# Patient Record
Sex: FEMALE | Race: WHITE | NOT HISPANIC OR LATINO | Employment: UNEMPLOYED | URBAN - NONMETROPOLITAN AREA
[De-identification: names, ages, dates, MRNs, and addresses within clinical notes are randomized per-mention and may not be internally consistent; named-entity substitution may affect disease eponyms.]

---
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
## 2018-01-05 ENCOUNTER — APPOINTMENT (OUTPATIENT)
Dept: LAB | Facility: HOSPITAL | Age: 18
End: 2018-01-05

## 2018-01-05 ENCOUNTER — OFFICE VISIT (OUTPATIENT)
Dept: ENDOCRINOLOGY | Facility: CLINIC | Age: 18
End: 2018-01-05

## 2018-01-05 ENCOUNTER — OFFICE VISIT (OUTPATIENT)
Dept: OBSTETRICS AND GYNECOLOGY | Facility: CLINIC | Age: 18
End: 2018-01-05

## 2018-01-05 VITALS
SYSTOLIC BLOOD PRESSURE: 128 MMHG | HEART RATE: 96 BPM | DIASTOLIC BLOOD PRESSURE: 80 MMHG | HEIGHT: 66 IN | WEIGHT: 169.25 LBS | BODY MASS INDEX: 27.2 KG/M2

## 2018-01-05 VITALS
WEIGHT: 168 LBS | BODY MASS INDEX: 27 KG/M2 | HEIGHT: 66 IN | SYSTOLIC BLOOD PRESSURE: 118 MMHG | DIASTOLIC BLOOD PRESSURE: 72 MMHG

## 2018-01-05 DIAGNOSIS — Z72.0 TOBACCO ABUSE: ICD-10-CM

## 2018-01-05 DIAGNOSIS — Z30.013 INITIATION OF DEPO PROVERA: ICD-10-CM

## 2018-01-05 DIAGNOSIS — R94.6 ABNORMAL THYROID FUNCTION TEST: Primary | ICD-10-CM

## 2018-01-05 DIAGNOSIS — R94.6 ABNORMAL THYROID FUNCTION TEST: ICD-10-CM

## 2018-01-05 DIAGNOSIS — N92.1 MENOMETRORRHAGIA: Primary | ICD-10-CM

## 2018-01-05 DIAGNOSIS — N94.6 DYSMENORRHEA: ICD-10-CM

## 2018-01-05 DIAGNOSIS — R53.83 FATIGUE, UNSPECIFIED TYPE: ICD-10-CM

## 2018-01-05 LAB
T4 FREE SERPL-MCNC: 1.09 NG/DL (ref 0.78–2.19)
TSH SERPL DL<=0.05 MIU/L-ACNC: 4.38 MIU/ML (ref 0.46–4.68)

## 2018-01-05 PROCEDURE — 99214 OFFICE O/P EST MOD 30 MIN: CPT | Performed by: NURSE PRACTITIONER

## 2018-01-05 PROCEDURE — 36415 COLL VENOUS BLD VENIPUNCTURE: CPT | Performed by: NURSE PRACTITIONER

## 2018-01-05 PROCEDURE — 84439 ASSAY OF FREE THYROXINE: CPT | Performed by: NURSE PRACTITIONER

## 2018-01-05 PROCEDURE — 84443 ASSAY THYROID STIM HORMONE: CPT | Performed by: NURSE PRACTITIONER

## 2018-01-05 PROCEDURE — 96372 THER/PROPH/DIAG INJ SC/IM: CPT | Performed by: OBSTETRICS & GYNECOLOGY

## 2018-01-05 PROCEDURE — 99212 OFFICE O/P EST SF 10 MIN: CPT | Performed by: OBSTETRICS & GYNECOLOGY

## 2018-01-05 PROCEDURE — 99406 BEHAV CHNG SMOKING 3-10 MIN: CPT | Performed by: NURSE PRACTITIONER

## 2018-01-05 RX ORDER — MEDROXYPROGESTERONE ACETATE 150 MG/ML
150 INJECTION, SUSPENSION INTRAMUSCULAR ONCE
Status: COMPLETED | OUTPATIENT
Start: 2018-01-05 | End: 2018-01-05

## 2018-01-05 RX ORDER — MEDROXYPROGESTERONE ACETATE 150 MG/ML
150 INJECTION, SUSPENSION INTRAMUSCULAR
Qty: 1 ML | Refills: 3 | Status: SHIPPED | OUTPATIENT
Start: 2018-01-05 | End: 2018-06-15 | Stop reason: DRUGHIGH

## 2018-01-05 RX ADMIN — MEDROXYPROGESTERONE ACETATE 150 MG: 150 INJECTION, SUSPENSION INTRAMUSCULAR at 10:18

## 2018-01-05 NOTE — PROGRESS NOTES
Subjective   Vianca Dunn is a 17 y.o. female.     HPI Comments: Patient presents today for f/u regarding menometrorrhagia and dysmenorrhea.   2 mo ago we had switched OCPs and patient reports further partial improvement in her symptoms  She requests the Depo Shot. We reviewed R/B/A. Risks discussed included irregular bleeding pattern, weight gain, slow return to fertility and loss of bone mineral density  Patient had an endocrine appointment earlier this morning      The following portions of the patient's history were reviewed and updated as appropriate: allergies, current medications, past family history, past medical history, past social history, past surgical history and problem list.      Review of Systems   Genitourinary: Positive for menstrual problem.       Objective   Physical Exam   Constitutional: She is oriented to person, place, and time. She appears well-developed and well-nourished. No distress.   HENT:   Head: Normocephalic and atraumatic.   Right Ear: External ear normal.   Left Ear: External ear normal.   Nose: Nose normal.   Mouth/Throat: Oropharynx is clear and moist.   Neurological: She is alert and oriented to person, place, and time.   Skin: She is not diaphoretic.   Psychiatric: She has a normal mood and affect. Her behavior is normal. Judgment and thought content normal.   Vitals reviewed.      Assessment/Plan   Vianca was seen today for follow-up.    Diagnoses and all orders for this visit:    Menometrorrhagia    Dysmenorrhea    Abnormal thyroid function test    Other orders  -     medroxyPROGESTERone (DEPO-PROVERA) 150 MG/ML injection; Inject 1 mL into the shoulder, thigh, or buttocks Every 3 (Three) Months.       Switch to Depo  F/u 3 mo when next shot is due  Continue f/u with endocrine

## 2018-01-05 NOTE — PROGRESS NOTES
Subjective    Vianca Dunn is a 17 y.o. female. she is here today for follow-up.    History of Present Illness           History      17 year old female presents for follow up      REASON - abnormal thyroid function studies         Duration  - August 2017     Timing - intermittent -- one abnormal in August repeat normal       Quality -  controlled     Severity - mild     Context - presented to GYN for heavy periods, thyroid function study was performed and found to be abnormal      Location/size - no ultrasound      Quantity -            Lab Results   Component Value Date     TSH 5.960 (H) 08/15/2017         Lab Results   Component Value Date    TSH 4.080 09/29/2017          Symptoms - heavy periods, fatigue, hair loss     Alleviating Factors - none     Aggravating Factors - none             Evaluation history:  TSH   Date Value Ref Range Status   09/29/2017 4.080 0.460 - 4.680 mIU/mL Final     Free T4   Date Value Ref Range Status   09/29/2017 1.29 0.78 - 2.19 ng/dL Final       Current medications:  Current Outpatient Prescriptions   Medication Sig Dispense Refill   • norgestrel-ethinyl estradiol (LOW-OGESTREL,CRYSELLE) 0.3-30 MG-MCG per tablet Take 1 tablet by mouth Daily. 28 tablet 12     No current facility-administered medications for this visit.        The following portions of the patient's history were reviewed and updated as appropriate:   Past Medical History:   Diagnosis Date   • Acute dermatitis      buttocks      • Acute gastroenteritis    • Acute otitis media, right    • Allergic rhinitis    • Candidiasis of skin      caitlin-anal area      • Lymphadenopathy    • Otitis media, chronic, bilateral    • Upper respiratory infection      History reviewed. No pertinent surgical history.  Family History   Problem Relation Age of Onset   • Thyroid disease Maternal Grandmother      OB History     No data available        No Known Allergies  Social History     Social History   • Marital status: Single     Spouse  "name: N/A   • Number of children: N/A   • Years of education: N/A     Social History Main Topics   • Smoking status: Current Every Day Smoker     Packs/day: 1.00     Types: Cigarettes   • Smokeless tobacco: Never Used   • Alcohol use No   • Drug use: No   • Sexual activity: Yes     Partners: Male     Birth control/ protection: OCP     Other Topics Concern   • None     Social History Narrative       Review of Systems  Review of Systems   Constitutional: Negative for activity change, appetite change, diaphoresis and fatigue.   HENT: Negative for congestion, dental problem, facial swelling, sneezing, sore throat, tinnitus, trouble swallowing and voice change.    Eyes: Negative for photophobia, pain, discharge, redness, itching and visual disturbance.   Respiratory: Negative for apnea, cough, choking, chest tightness and shortness of breath.    Cardiovascular: Negative for chest pain, palpitations and leg swelling.   Gastrointestinal: Negative for abdominal distention, abdominal pain, constipation, diarrhea, nausea and vomiting.   Endocrine: Negative for cold intolerance, heat intolerance, polydipsia, polyphagia and polyuria.   Genitourinary: Negative for difficulty urinating, dysuria, frequency, hematuria and urgency.   Musculoskeletal: Negative for arthralgias, back pain, gait problem, joint swelling, myalgias, neck pain and neck stiffness.   Skin: Negative for color change, pallor, rash and wound.   Allergic/Immunologic: Negative for environmental allergies and food allergies.   Neurological: Negative for dizziness, tremors, facial asymmetry, weakness, light-headedness, numbness and headaches.   Hematological: Negative for adenopathy. Does not bruise/bleed easily.   Psychiatric/Behavioral: Negative for agitation, behavioral problems, confusion and sleep disturbance.        Objective    /80 (BP Location: Left arm, Patient Position: Sitting, Cuff Size: Adult)  Pulse 96  Ht 167.6 cm (66\")  Wt 76.8 kg (169 lb 4 " oz)  LMP 12/16/2017 (Exact Date)  Breastfeeding? No  BMI 27.32 kg/m2  Physical Exam   Constitutional: She is oriented to person, place, and time. She appears well-developed and well-nourished. No distress.   HENT:   Head: Normocephalic and atraumatic.   Right Ear: External ear normal.   Left Ear: External ear normal.   Nose: Nose normal.   Eyes: Conjunctivae and EOM are normal. Pupils are equal, round, and reactive to light.   Neck: Normal range of motion. Neck supple. No tracheal deviation present. No thyromegaly present.   Cardiovascular: Normal rate, regular rhythm and normal heart sounds.    No murmur heard.  Pulmonary/Chest: Effort normal and breath sounds normal. No respiratory distress. She has no wheezes.   Abdominal: Soft. Bowel sounds are normal. There is no tenderness. There is no rebound and no guarding.   Musculoskeletal: Normal range of motion. She exhibits no edema, tenderness or deformity.   Neurological: She is alert and oriented to person, place, and time. No cranial nerve deficit.   Skin: Skin is warm and dry. No rash noted.   Psychiatric: She has a normal mood and affect. Her behavior is normal. Judgment and thought content normal.       Lab Review  Lab Results   Component Value Date    TSH 4.080 09/29/2017     Lab Results   Component Value Date    FREET4 1.29 09/29/2017        Assessment/Plan      1. Abnormal thyroid function test    2. Fatigue, unspecified type    3. Tobacco abuse    . This diagnosis was discussed and reviewed with the patient including the advantages of drug therapy.     1. Orders placed during this encounter include:  Orders Placed This Encounter   Procedures   • TSH   • T4, Free       Medications prescribed:  Outpatient Encounter Prescriptions as of 1/5/2018   Medication Sig Dispense Refill   • norgestrel-ethinyl estradiol (LOW-OGESTREL,CRYSELLE) 0.3-30 MG-MCG per tablet Take 1 tablet by mouth Daily. 28 tablet 12     No facility-administered encounter medications on file  as of 1/5/2018.      Abnormal thyroid function repeat normal   Fatigue         Lab Results   Component Value Date    TSH 4.080 09/29/2017    D8GXMBE 236.0 (H) 09/29/2017    THYROIDAB 10 09/29/2017         Repeat labs again today and I will call with results    If normal she will just follow up with primary care to monitor TSH            Fatigue      Try to exercise   Discussed sleep habits     Menometrorrhagia     Continue OCPs    Preventive Care     I advised the patient of the risks in continuing to use tobacco, and I provided this patient with smoking cessation educational materials.  I also discussed how to quit smoking and the patient has expressed no willingness to quit.      During this visit, I spent 3 minutes  counseling the patient regarding smoking cessation.            4. Return for Recheck.

## 2018-03-30 ENCOUNTER — CLINICAL SUPPORT (OUTPATIENT)
Dept: OBSTETRICS AND GYNECOLOGY | Facility: CLINIC | Age: 18
End: 2018-03-30

## 2018-03-30 DIAGNOSIS — Z30.42 SURVEILLANCE FOR DEPO-PROVERA CONTRACEPTION: Primary | ICD-10-CM

## 2018-03-30 PROCEDURE — 96372 THER/PROPH/DIAG INJ SC/IM: CPT | Performed by: OBSTETRICS & GYNECOLOGY

## 2018-03-30 RX ORDER — MEDROXYPROGESTERONE ACETATE 150 MG/ML
150 INJECTION, SUSPENSION INTRAMUSCULAR ONCE
Status: COMPLETED | OUTPATIENT
Start: 2018-03-30 | End: 2018-03-30

## 2018-03-30 RX ADMIN — MEDROXYPROGESTERONE ACETATE 150 MG: 150 INJECTION, SUSPENSION INTRAMUSCULAR at 08:58

## 2018-06-15 ENCOUNTER — CLINICAL SUPPORT (OUTPATIENT)
Dept: OBSTETRICS AND GYNECOLOGY | Facility: CLINIC | Age: 18
End: 2018-06-15

## 2018-06-15 DIAGNOSIS — Z30.42 SURVEILLANCE FOR DEPO-PROVERA CONTRACEPTION: Primary | ICD-10-CM

## 2018-06-15 PROCEDURE — 96372 THER/PROPH/DIAG INJ SC/IM: CPT | Performed by: OBSTETRICS & GYNECOLOGY

## 2018-06-15 RX ORDER — MEDROXYPROGESTERONE ACETATE 150 MG/ML
150 INJECTION, SUSPENSION INTRAMUSCULAR ONCE
Status: COMPLETED | OUTPATIENT
Start: 2018-06-15 | End: 2018-06-15

## 2018-06-15 RX ORDER — MEDROXYPROGESTERONE ACETATE 150 MG/ML
INJECTION, SUSPENSION INTRAMUSCULAR
COMMUNITY
Start: 2018-06-14 | End: 2018-06-15 | Stop reason: DRUGHIGH

## 2018-06-15 RX ORDER — MEDROXYPROGESTERONE ACETATE 150 MG/ML
150 INJECTION, SUSPENSION INTRAMUSCULAR
COMMUNITY
End: 2018-06-15 | Stop reason: DRUGHIGH

## 2018-06-15 RX ADMIN — MEDROXYPROGESTERONE ACETATE 150 MG: 150 INJECTION, SUSPENSION INTRAMUSCULAR at 09:16

## 2018-09-07 ENCOUNTER — CLINICAL SUPPORT (OUTPATIENT)
Dept: OBSTETRICS AND GYNECOLOGY | Facility: CLINIC | Age: 18
End: 2018-09-07

## 2018-09-07 DIAGNOSIS — Z30.42 SURVEILLANCE FOR DEPO-PROVERA CONTRACEPTION: Primary | ICD-10-CM

## 2018-09-07 PROCEDURE — 96372 THER/PROPH/DIAG INJ SC/IM: CPT | Performed by: NURSE PRACTITIONER

## 2018-09-07 RX ORDER — MEDROXYPROGESTERONE ACETATE 150 MG/ML
150 INJECTION, SUSPENSION INTRAMUSCULAR ONCE
Status: COMPLETED | OUTPATIENT
Start: 2018-09-07 | End: 2018-09-07

## 2018-09-07 RX ORDER — MEDROXYPROGESTERONE ACETATE 150 MG/ML
150 INJECTION, SUSPENSION INTRAMUSCULAR
Qty: 1 ML | Refills: 3 | Status: SHIPPED | OUTPATIENT
Start: 2018-09-07 | End: 2018-11-28 | Stop reason: SDUPTHER

## 2018-09-07 RX ADMIN — MEDROXYPROGESTERONE ACETATE 150 MG: 150 INJECTION, SUSPENSION INTRAMUSCULAR at 09:38

## 2018-11-28 RX ORDER — MEDROXYPROGESTERONE ACETATE 150 MG/ML
150 INJECTION, SUSPENSION INTRAMUSCULAR
Qty: 1 ML | Refills: 3 | Status: SHIPPED | OUTPATIENT
Start: 2018-11-28 | End: 2019-08-12

## 2018-11-29 ENCOUNTER — CLINICAL SUPPORT (OUTPATIENT)
Dept: OBSTETRICS AND GYNECOLOGY | Facility: CLINIC | Age: 18
End: 2018-11-29

## 2018-11-29 DIAGNOSIS — Z30.42 SURVEILLANCE FOR DEPO-PROVERA CONTRACEPTION: Primary | ICD-10-CM

## 2018-11-29 PROCEDURE — 96372 THER/PROPH/DIAG INJ SC/IM: CPT | Performed by: NURSE PRACTITIONER

## 2018-11-29 RX ORDER — MEDROXYPROGESTERONE ACETATE 150 MG/ML
150 INJECTION, SUSPENSION INTRAMUSCULAR ONCE
Status: COMPLETED | OUTPATIENT
Start: 2018-11-29 | End: 2018-11-29

## 2018-11-29 RX ADMIN — MEDROXYPROGESTERONE ACETATE 150 MG: 150 INJECTION, SUSPENSION INTRAMUSCULAR at 08:05

## 2019-02-28 ENCOUNTER — CLINICAL SUPPORT (OUTPATIENT)
Dept: OBSTETRICS AND GYNECOLOGY | Facility: CLINIC | Age: 19
End: 2019-02-28

## 2019-02-28 DIAGNOSIS — Z30.42 SURVEILLANCE FOR DEPO-PROVERA CONTRACEPTION: Primary | ICD-10-CM

## 2019-02-28 PROCEDURE — 96372 THER/PROPH/DIAG INJ SC/IM: CPT | Performed by: NURSE PRACTITIONER

## 2019-02-28 RX ORDER — MEDROXYPROGESTERONE ACETATE 150 MG/ML
150 INJECTION, SUSPENSION INTRAMUSCULAR ONCE
Status: COMPLETED | OUTPATIENT
Start: 2019-02-28 | End: 2019-02-28

## 2019-02-28 RX ADMIN — MEDROXYPROGESTERONE ACETATE 150 MG: 150 INJECTION, SUSPENSION INTRAMUSCULAR at 14:52

## 2019-05-20 ENCOUNTER — CLINICAL SUPPORT (OUTPATIENT)
Dept: OBSTETRICS AND GYNECOLOGY | Facility: CLINIC | Age: 19
End: 2019-05-20

## 2019-05-20 DIAGNOSIS — Z30.42 SURVEILLANCE FOR DEPO-PROVERA CONTRACEPTION: Primary | ICD-10-CM

## 2019-05-20 PROCEDURE — 96372 THER/PROPH/DIAG INJ SC/IM: CPT | Performed by: NURSE PRACTITIONER

## 2019-05-20 RX ORDER — MEDROXYPROGESTERONE ACETATE 150 MG/ML
150 INJECTION, SUSPENSION INTRAMUSCULAR ONCE
Status: COMPLETED | OUTPATIENT
Start: 2019-05-20 | End: 2019-05-20

## 2019-05-20 RX ADMIN — MEDROXYPROGESTERONE ACETATE 150 MG: 150 INJECTION, SUSPENSION INTRAMUSCULAR at 15:21

## 2019-08-12 ENCOUNTER — APPOINTMENT (OUTPATIENT)
Dept: LAB | Facility: HOSPITAL | Age: 19
End: 2019-08-12

## 2019-08-12 ENCOUNTER — PROCEDURE VISIT (OUTPATIENT)
Dept: OBSTETRICS AND GYNECOLOGY | Facility: CLINIC | Age: 19
End: 2019-08-12

## 2019-08-12 VITALS
HEIGHT: 66 IN | BODY MASS INDEX: 25.39 KG/M2 | WEIGHT: 158 LBS | HEART RATE: 97 BPM | DIASTOLIC BLOOD PRESSURE: 93 MMHG | SYSTOLIC BLOOD PRESSURE: 132 MMHG

## 2019-08-12 DIAGNOSIS — Z30.011 ENCOUNTER FOR INITIAL PRESCRIPTION OF CONTRACEPTIVE PILLS: ICD-10-CM

## 2019-08-12 DIAGNOSIS — Z01.411 ENCOUNTER FOR GYNECOLOGICAL EXAMINATION WITH ABNORMAL FINDING: Primary | ICD-10-CM

## 2019-08-12 DIAGNOSIS — N94.6 DYSMENORRHEA: ICD-10-CM

## 2019-08-12 DIAGNOSIS — N94.10 DYSPAREUNIA IN FEMALE: ICD-10-CM

## 2019-08-12 PROCEDURE — 99395 PREV VISIT EST AGE 18-39: CPT | Performed by: NURSE PRACTITIONER

## 2019-08-12 PROCEDURE — G0123 SCREEN CERV/VAG THIN LAYER: HCPCS | Performed by: NURSE PRACTITIONER

## 2019-08-12 RX ORDER — NORETHINDRONE ACETATE AND ETHINYL ESTRADIOL, AND FERROUS FUMARATE 1MG-20(24)
1 KIT ORAL DAILY
Qty: 84 CAPSULE | Refills: 4 | Status: SHIPPED | OUTPATIENT
Start: 2019-08-12 | End: 2019-11-15 | Stop reason: CLARIF

## 2019-08-12 NOTE — PROGRESS NOTES
Subjective   Vianca Dunn is a 19 y.o. Here for pap smear and wants to change her birth control. Has concerns about painful intercourse.    LMP- 1/2018; has been on depo and not having periods    Prior to Depo her periods were very heavy for 8-10 days and very painful. She had been able to use tampons previously but right before she switched to Depo she was having so much pain with tampon use that she had to stop using them. She reports that intercourse has always been painful.     Last pap- CCHD at age 15      Gynecologic Exam   The patient's pertinent negatives include no genital itching, genital lesions, genital odor, genital rash, pelvic pain, vaginal bleeding or vaginal discharge. Pertinent negatives include no abdominal pain, constipation, diarrhea or dysuria. She is sexually active. No, her partner does not have an STD. She uses progestin injections for contraception. Menstrual history: absent. Her past medical history is significant for menorrhagia. There is no history of endometriosis, a gynecological surgery, miscarriage, ovarian cysts, PID, an STD, a terminated pregnancy or vaginosis.       The following portions of the patient's history were reviewed and updated as appropriate: allergies, current medications, past family history, past medical history, past social history, past surgical history and problem list.    Review of Systems   Constitutional: Negative for activity change, appetite change, diaphoresis, fatigue, unexpected weight gain and unexpected weight loss.   Respiratory: Negative for chest tightness and shortness of breath.    Cardiovascular: Negative for chest pain and palpitations.   Gastrointestinal: Negative for abdominal distention, abdominal pain, constipation and diarrhea.   Endocrine: Negative.    Genitourinary: Positive for amenorrhea, dyspareunia and menorrhagia. Negative for breast discharge, breast lump, breast pain, decreased libido, dysuria, menstrual problem, pelvic pain,  vaginal bleeding, vaginal discharge and vaginal pain.   Musculoskeletal: Negative for myalgias.   Skin: Negative for color change, dry skin and skin lesions.   Neurological: Negative for light-headedness and headache.   Psychiatric/Behavioral: Negative for agitation, dysphoric mood, sleep disturbance, depressed mood and stress. The patient is not nervous/anxious.        Objective   Physical Exam   Constitutional: She is oriented to person, place, and time. She appears well-developed and well-nourished.   Neck: No thyromegaly present.   Cardiovascular: Normal rate, regular rhythm, normal heart sounds and intact distal pulses.   Pulmonary/Chest: Effort normal and breath sounds normal. Right breast exhibits no inverted nipple, no mass, no nipple discharge, no skin change and no tenderness. Left breast exhibits no inverted nipple, no mass, no nipple discharge, no skin change and no tenderness. Breasts are symmetrical.   Abdominal: Soft. Bowel sounds are normal. She exhibits no distension. There is no tenderness.   Genitourinary: Uterus normal.       No breast discharge or bleeding. There is no rash, tenderness, lesion or injury on the right labia. There is no rash, tenderness, lesion or injury on the left labia. Cervix exhibits friability. Cervix exhibits no motion tenderness and no discharge. Right adnexum displays no mass, no tenderness and no fullness. Left adnexum displays no mass, no tenderness and no fullness. There is tenderness in the vagina. No erythema or bleeding in the vagina. No foreign body in the vagina. No signs of injury around the vagina. Vaginal discharge found.   Genitourinary Comments: Point tenderness noted from 6-00 to 9-00 with tension noted at the posterior fourchette. Pap smear obtained.    Lymphadenopathy:     She has no axillary adenopathy.        Right: No inguinal adenopathy present.        Left: No inguinal adenopathy present.   Neurological: She is alert and oriented to person, place, and  time.   Skin: Skin is warm, dry and intact.   Psychiatric: She has a normal mood and affect. Her speech is normal and behavior is normal.   Nursing note and vitals reviewed.        Assessment/Plan   Vianca was seen today for gynecologic exam.    Diagnoses and all orders for this visit:    Encounter for gynecological examination with abnormal finding  -     Liquid-based Pap Smear, Screening    Dyspareunia in female  -     Ambulatory Referral to Physical Therapy Evaluate and treat, Pelvic Floor    Dysmenorrhea    Encounter for initial prescription of contraceptive pills    Other orders  -     TAYTULLA 1-20 MG-MCG(24) capsule; Take 1 tablet by mouth Daily.    Stop Depo. Start on taytulla OCP; samples given and Rx sent. R/B/A and potential s/e reviewed. Referral sent to PFPT for pain with intercourse.

## 2019-08-19 LAB
GEN CATEG CVX/VAG CYTO-IMP: NORMAL
LAB AP CASE REPORT: NORMAL
LAB AP GYN ADDITIONAL INFORMATION: NORMAL
PATH INTERP SPEC-IMP: NORMAL
STAT OF ADQ CVX/VAG CYTO-IMP: NORMAL

## 2019-09-04 ENCOUNTER — HOSPITAL ENCOUNTER (OUTPATIENT)
Dept: PHYSICAL THERAPY | Facility: HOSPITAL | Age: 19
Setting detail: THERAPIES SERIES
Discharge: HOME | End: 2019-09-04

## 2019-09-04 DIAGNOSIS — R10.2 PELVIC PAIN: Primary | ICD-10-CM

## 2019-09-04 PROCEDURE — 97162 PT EVAL MOD COMPLEX 30 MIN: CPT | Performed by: PHYSICAL THERAPIST

## 2019-09-04 NOTE — THERAPY EVALUATION
Outpatient Physical Therapy Pelvic Health Initial Evaluation  [facility]     Patient Name: Vianca Dunn  : 2000  MRN: [id number]  Today's Date: 2019        Visit Date: 2019  Visit number:   Recheck: 10/3/19  Insurance: [employer] (60 v per year)      Patient Active Problem List   Diagnosis   • Menometrorrhagia   • Dysmenorrhea   • Abnormal thyroid function test   • Fatigue   • Tobacco abuse   • Encounter for initial prescription of contraceptive pills        Past Medical History:   Diagnosis Date   • Acute dermatitis      buttocks      • Acute gastroenteritis    • Acute otitis media, right    • Allergic rhinitis    • Candidiasis of skin      caitlin-anal area      • Lymphadenopathy    • Otitis media, chronic, bilateral    • Pelvic pain    • Upper respiratory infection         Past Surgical History:   Procedure Laterality Date   • CYST REMOVAL Left     L arm cyst removed         Visit Dx:    ICD-10-CM ICD-9-CM   1. Pelvic pain R10.2 [id number]           Pelvic Health     Row Name 19 1600             Subjective Comments    Subjective Comments  Pelvic pain always present.  Had abdominal cramping with periods previously.  Ovarian cysts noted in history. Rough periods with heavy bleeding noted.  Used Depo for 2 years and has not had a period since.  Has now changed BC to oral medication and has yet to have a period.  Became sexually active at age 14.  Pain has been painful since onset.  Mostly now experienced pain with intercourse. Changed partners but has had pain with all.  Quadruped position hurts the worse and when on top she has less pain. Pain noted with penetration and thrust as well as deep penetration.   Descriptors given as jabbing and sharp pain in vaginal wall.  No pain with clitoral stimulation or use of vibration.  Oral stimulus does not hurt.  Feels that level of arousal is there post foreplay.  Feels lubricated well, but uses lubricant as well.  Unsure of types used.  Has  not use tampons regularly due to pain with application.  No pain with urination or defecation. Marinoff scale 2.   -SW         Pregnancy Questions    Number of Pregnancies  0  -SW      Number of Miscarriages  0  -SW      Number of Children  0  -SW         Pain Assessment    Pain Assessment  No/denies pain with intercourse inc 8/10  -SW         Pelvic Floor Muscle    Patient/Parent/Guardian Consented to Internal Pelvic Floor Exam  Yes  -SW      Strength (Right)  4: Strong contraction  -SW      Strength (Left)  4: Strong contraction  -SW      Symmetry of Sustained Maximal Contraction  Symmetrical  -SW      Endurance (Ability to Hold Maximal Contraction)  10 sec  -SW      # of Reps of Maximal Contractions while Maintaining Endurance and Strength  5 sets  -SW      Fast Contraction (# of 1 sec contractions performed)  10  -SW      Internal Pelvic Floor Comments  Pain with superficial layer palpation R>L directly at vaginal opening.  Obt internus ttp bilaterally, pubococcygeus post end + ttp.  Small introitus noted.  Intravaginal cuff feels firm with dec suppleness of tissue mobility.  Urethra sits ant in vaginal outlet.  No signs of inflammation or irritation. Valentin's line normal.  No signs of discharge or lesion identified.  Qtip test negative.  Bladder palpation is reactive with flinching noted.    -SW      External Pelvic Floor Comments  Tenderness exists with palpation at sacrotuberous ligament.  Mild at inf pelvic ring bilaterally.  No other tenderness is noted surrounding tissues of perineum.  No hemorrhoids.  Perineal body present in elevated position.  Visual lift identified and viewed with contraction command.   -         Observations    Perineal Observation Performed?  Yes  -SW      Posture Observations  Posturally stands with upright position.  Shallow breaths noted with some accessory function.  Fair diaphragmatic breaths noted.    -         Observation of Contraction in Perineum    Anal [place]  Present  -SW       Perineal Body Lift  Present  -SW         Pelvic Floor    Ability to Isolate Contraction of Pelvic Floor  Yes  -SW         Prolapse (Pop-Q)    Prolapse Comments  none identified.  Urthrea sits lower into vaginal outlet, but no identified POP this date.   -SW         SEMG Evaluation    Pelvic Floor Electrode  External Surface  -SW      Baseline Resting  Yes  -SW      SEMG Evaluation Comments  normal resting tones achieved in supine position.  Occassional erratic behavior in beginning with PF, but levels and sustained normal baselines.  QF with isolation and endurance of 10 sec with good stability noted.    -SW         Education Provided On:    Education Points  Minimize Irritation to Vulvar Tissue with Handout;Behavioral modifications lubricant choice/use  -SW      Method of Delivery  Verbal;Demonstration;Written  -SW      Education Provided To  Patient  -SW      Level of Understanding  Teach back education performed;Verbalized;Demonstrated  -SW        User Key  (r) = Recorded By, (t) = Taken By, (c) = Cosigned By    Initials Name Provider Type    Yue Arriaga, PT Physical Therapist                       PT Assessment/Plan     Row Name 09/04/19 1700          PT Assessment    Functional Limitations  Limitation in home management;Performance in leisure activities;Performance in self-care ADL  -SW     Impairments  Impaired muscle length;Pain;Other (comment) painful intercourse, inability to use tampons  -SW     Assessment Comments  Patient is a 18 yo female presenting with pelvic pain associated with intercourse, manual exams or use of tampons.  Patient has always had pain since induction of intercourse at age 13.  She has pain with penetration as well as deep thrust.  She exhibits dec motility of PF tissue intravaginally along with myofascial pain to abdominopelvic fascia.  She would benefit from skilled therapy intervention to address concerns and allow inc function with daily activities.   -SW     Rehab  Potential  Good  -SW     Patient/caregiver participated in establishment of treatment plan and goals  Yes  -SW     Patient would benefit from skilled therapy intervention  Yes  -SW        PT Plan    PT Frequency  1x/week  -SW     Predicted Duration of Therapy Intervention (Therapy Eval)  10 visits  -     PT Plan Comments  Manual therapy, soft tissue mobilization, abd fascial work. Consideration given to dilator use for vaginal stretching. Education on mechanics, lubraication, dietary intake etc as medically necessary.   -       User Key  (r) = Recorded By, (t) = Taken By, (c) = Cosigned By    Initials Name Provider Type    Yue Arriaga, PT Physical Therapist            OP Exercises     Row Name 09/04/19 1600             Subjective Comments    Subjective Comments  Pelvic pain always present.  Had abdominal cramping with periods previously.  Ovarian cysts noted in history. Rough periods with heavy bleeding noted.  Used Depo for 2 years and has not had a period since.  Has now changed BC to oral medication and has yet to have a period.  Became sexually active at age 14.  Pain has been painful since onset.  Mostly now experienced pain with intercourse. Changed partners but has had pain with all.  Quadruped position hurts the worse and when on top she has less pain. Pain noted with penetration and thrust as well as deep penetration.   Descriptors given as jabbing and sharp pain in vaginal wall.  No pain with clitoral stimulation or use of vibration.  Oral stimulus does not hurt.  Feels that level of arousal is there post foreplay.  Feels lubricated well, but uses lubricant as well.  Unsure of types used.  Has not use tampons regularly due to pain with application.  No pain with urination or defecation. Marinoff scale 2.   -SW         Exercise 1    Exercise Name 1  PF excursion   -SW      Time 1  3 min  -SW         Exercise 2    Exercise Name 2  lubricant choices  -SW      Additional Comments  educated re:  lubricant choices  -         Exercise 3    Exercise Name 3  stress management  -      Additional Comments  patient and therapist discussed stress management.  Patient has no normal outreach for management of condition.  Instructed her to find something that promotes relaxation this week for discussion next week as stress management is huge in management of pain.   -        User Key  (r) = Recorded By, (t) = Taken By, (c) = Cosigned By    Initials Name Provider Type    SW Yue Ocasio, PT Physical Therapist                      PT OP Goals     Row Name 09/04/19 1700          PT Short Term Goals    STG Date to Achieve  10/04/19  -     STG 1  patient to demo home stretching program for elongation of tissues of involved structures   -     STG 1 Progress  New  -     STG 2  patient to verbalize understanding of lubricant choices such that she can make educated decisions in their purhcase as to dec irritability to tissues.   -     STG 2 Progress  New  -     STG 3  patient to demo abd release for HEp such that trigger of descending colon are minimized and allowed motility without inc pain or restrictions.   -     STG 3 Progress  New  -     STG 4  patient to tolerate manual intravaginal work without pain climbing greater than 2 points on VAS  -     STG 4 Progress  New  -     STG 5  Patient to demo PF excursion as to dec pressure to perineum with PF penetration.   -     STG 5 Progress  New  -     STG 6  patient to begin dilators with tolerance and without inc pain.   -     STG 6 Progress  New  -        Long Term Goals    LTG Date to Achieve  12/31/19  -     LTG 1  Marinoff scale of 0-1  -     LTG 1 Progress  New  -     LTG 2  patient to demonstrate manual examination without pain to muscles of intravaginal wall showing inc mobility and less adherence throughout.   -     LTG 2 Progress  New  -     LTG 3  Patient to be independent with self home management for maintaining  mobility of tissues to allow for penetration without pain.   -     LTG 3 Progress  New  -        Time Calculation    PT Goal Re-Cert Due Date  10/04/19  -       User Key  (r) = Recorded By, (t) = Taken By, (c) = Cosigned By    Initials Name Provider Type    Yue Arriaga, PT Physical Therapist          Therapy Education  Given: HEP  Program: New  How Provided: Verbal, Demonstration  Provided to: Patient  Level of Understanding: Teach back education performed, Verbalized, Demonstrated               Time Calculation:   Start Time: 1615  Stop Time: 1718  Time Calculation (min): 63 min  Therapy Charges for Today     Code Description Service Date Service Provider Modifiers Qty    [id number]  PT EVAL MOD COMPLEXITY 4 9/4/2019 Yue Ocasio, PT GP 1    [id number]  PT THER SUPP EA 15 MIN 9/4/2019 Yue Ocasio, PT GP 1                  Yue Ocasio, PT  9/4/2019

## 2019-09-25 ENCOUNTER — APPOINTMENT (OUTPATIENT)
Dept: PHYSICAL THERAPY | Facility: HOSPITAL | Age: 19
End: 2019-09-25

## 2019-11-13 ENCOUNTER — TELEPHONE (OUTPATIENT)
Dept: OBSTETRICS AND GYNECOLOGY | Facility: CLINIC | Age: 19
End: 2019-11-13

## 2019-11-15 ENCOUNTER — TELEPHONE (OUTPATIENT)
Dept: OBSTETRICS AND GYNECOLOGY | Facility: CLINIC | Age: 19
End: 2019-11-15

## 2019-11-15 RX ORDER — NORETHINDRONE ACETATE AND ETHINYL ESTRADIOL AND FERROUS FUMARATE 1MG-20(24)
1 KIT ORAL DAILY
Qty: 28 TABLET | Refills: 12 | Status: SHIPPED | OUTPATIENT
Start: 2019-11-15 | End: 2020-10-19 | Stop reason: SDUPTHER

## 2019-11-15 NOTE — TELEPHONE ENCOUNTER
PT CALLED AND HER BC IS NOT COVERED BY INSURANCE.  NEEDS TO BE GENERIC OR CHANGED.  [phone].  PLEASE RETURN HER CALL.

## 2020-10-19 ENCOUNTER — TELEPHONE (OUTPATIENT)
Dept: OBSTETRICS AND GYNECOLOGY | Facility: CLINIC | Age: 20
End: 2020-10-19

## 2020-10-19 RX ORDER — NORETHINDRONE ACETATE AND ETHINYL ESTRADIOL AND FERROUS FUMARATE 1MG-20(24)
1 KIT ORAL DAILY
Qty: 84 TABLET | Refills: 4 | Status: SHIPPED | OUTPATIENT
Start: 2020-10-19 | End: 2020-11-11 | Stop reason: SDUPTHER

## 2020-10-19 NOTE — TELEPHONE ENCOUNTER
Can patient please get 1 month refill until her scheduled appointment?    She uses [facility] in [facility].      Thanks!

## 2020-11-11 ENCOUNTER — OFFICE VISIT (OUTPATIENT)
Dept: OBSTETRICS AND GYNECOLOGY | Facility: CLINIC | Age: 20
End: 2020-11-11

## 2020-11-11 VITALS
WEIGHT: 161 LBS | HEIGHT: 66 IN | BODY MASS INDEX: 25.88 KG/M2 | DIASTOLIC BLOOD PRESSURE: 80 MMHG | SYSTOLIC BLOOD PRESSURE: 120 MMHG

## 2020-11-11 DIAGNOSIS — Z11.3 SCREEN FOR STD (SEXUALLY TRANSMITTED DISEASE): ICD-10-CM

## 2020-11-11 DIAGNOSIS — Z30.41 ENCOUNTER FOR SURVEILLANCE OF CONTRACEPTIVE PILLS: Primary | ICD-10-CM

## 2020-11-11 PROCEDURE — 87491 CHLMYD TRACH DNA AMP PROBE: CPT | Performed by: NURSE PRACTITIONER

## 2020-11-11 PROCEDURE — 99213 OFFICE O/P EST LOW 20 MIN: CPT | Performed by: NURSE PRACTITIONER

## 2020-11-11 PROCEDURE — 87661 TRICHOMONAS VAGINALIS AMPLIF: CPT | Performed by: NURSE PRACTITIONER

## 2020-11-11 PROCEDURE — 87591 N.GONORRHOEAE DNA AMP PROB: CPT | Performed by: NURSE PRACTITIONER

## 2020-11-11 RX ORDER — NORETHINDRONE ACETATE AND ETHINYL ESTRADIOL 1MG-20(21)
1 KIT ORAL DAILY
Qty: 84 TABLET | Refills: 4 | Status: SHIPPED | OUTPATIENT
Start: 2020-11-11 | End: 2021-09-29

## 2020-11-11 RX ORDER — NORETHINDRONE ACETATE AND ETHINYL ESTRADIOL 1MG-20(21)
KIT ORAL
COMMUNITY
Start: 2020-10-17 | End: 2020-11-11 | Stop reason: SDUPTHER

## 2020-11-11 NOTE — PROGRESS NOTES
Subjective   Vianca Dunn is a 20 y.o. needs refills on birth control pills. No concerns at this time.     LMP- 11/11  Lasts for about 5 days. Very light bleeding and only mild cramping on CD1.    Gynecologic Exam  The patient's pertinent negatives include no genital itching, genital lesions, genital odor, genital rash, missed menses, pelvic pain, vaginal bleeding or vaginal discharge. Pertinent negatives include no chills, dysuria, fever or nausea. She is sexually active. No, her partner does not have an STD. She uses oral contraceptives for contraception. Her menstrual history has been regular.       The following portions of the patient's history were reviewed and updated as appropriate: allergies, current medications, past social history and problem list.    Review of Systems   Constitutional: Negative for activity change, appetite change, chills, diaphoresis, fatigue, fever, unexpected weight gain and unexpected weight loss.   Respiratory: Negative for chest tightness and shortness of breath.    Cardiovascular: Negative for chest pain and palpitations.   Gastrointestinal: Negative for nausea.   Genitourinary: Negative for amenorrhea, breast discharge, breast lump, breast pain, decreased libido, dyspareunia, dysuria, menstrual problem, missed menses, pelvic pain, pelvic pressure, vaginal bleeding, vaginal discharge and vaginal pain.   Musculoskeletal: Negative for myalgias.   Skin: Negative for color change, dry skin and skin lesions.   Neurological: Negative for headache.   Psychiatric/Behavioral: Negative for agitation, dysphoric mood, sleep disturbance, depressed mood and stress. The patient is not nervous/anxious.        Objective   Physical Exam  Vitals signs and nursing note reviewed.   Constitutional:       General: She is awake. She is not in acute distress.     Appearance: Normal appearance. She is well-developed, well-groomed and normal weight. She is not ill-appearing, toxic-appearing or diaphoretic.    Cardiovascular:      Rate and Rhythm: Normal rate and regular rhythm.      Heart sounds: Normal heart sounds.   Pulmonary:      Effort: Pulmonary effort is normal.      Breath sounds: Normal breath sounds.   Skin:     General: Skin is warm and dry.   Neurological:      Mental Status: She is alert and oriented to person, place, and time.   Psychiatric:         Attention and Perception: Attention and perception normal.         Mood and Affect: Mood and affect normal.         Speech: Speech normal.         Behavior: Behavior normal. Behavior is cooperative.           Assessment/Plan   Diagnoses and all orders for this visit:    1. Encounter for surveillance of contraceptive pills (Primary)    2. Screen for STD (sexually transmitted disease)  -     CT/NG, TV, PCR - Urine, Urine, Random Void  -     Chlamydia trachomatis, Neisseria gonorrhoeae, PCR - Urine, Urine, Random Void  -     Trichomonas vaginalis, PCR - Urine, Urine, Random Void    Other orders  -     Blisovi FE 1/20 1-20 MG-MCG per tablet; Take 1 tablet by mouth Daily.  Dispense: 84 tablet; Refill: 4      Will call with abnormal results only. RBA and potential s/e of OCPs reviewed. Next pap due in 2022.

## 2020-11-16 LAB
C TRACH RRNA SPEC QL NAA+PROBE: NEGATIVE
N GONORRHOEA RRNA SPEC QL NAA+PROBE: NEGATIVE
T VAGINALIS DNA SPEC QL NAA+PROBE: NEGATIVE

## 2021-09-29 ENCOUNTER — LAB (OUTPATIENT)
Dept: LAB | Facility: HOSPITAL | Age: 21
End: 2021-09-29

## 2021-09-29 ENCOUNTER — INITIAL PRENATAL (OUTPATIENT)
Dept: OBSTETRICS AND GYNECOLOGY | Facility: CLINIC | Age: 21
End: 2021-09-29

## 2021-09-29 VITALS — SYSTOLIC BLOOD PRESSURE: 110 MMHG | DIASTOLIC BLOOD PRESSURE: 64 MMHG | BODY MASS INDEX: 22.6 KG/M2 | WEIGHT: 140 LBS

## 2021-09-29 DIAGNOSIS — Z32.01 ENCOUNTER FOR PREGNANCY TEST, RESULT POSITIVE: ICD-10-CM

## 2021-09-29 DIAGNOSIS — Z13.79 GENETIC SCREENING: ICD-10-CM

## 2021-09-29 DIAGNOSIS — Z3A.12 12 WEEKS GESTATION OF PREGNANCY: ICD-10-CM

## 2021-09-29 DIAGNOSIS — Z36.87 UNSURE OF LMP (LAST MENSTRUAL PERIOD) AS REASON FOR ULTRASOUND SCAN: Primary | ICD-10-CM

## 2021-09-29 DIAGNOSIS — Z34.01 PRIMIGRAVIDA IN FIRST TRIMESTER: ICD-10-CM

## 2021-09-29 PROBLEM — Z30.41 ENCOUNTER FOR SURVEILLANCE OF CONTRACEPTIVE PILLS: Status: RESOLVED | Noted: 2019-08-12 | Resolved: 2021-09-29

## 2021-09-29 PROBLEM — Z72.0 TOBACCO ABUSE: Status: RESOLVED | Noted: 2018-01-05 | Resolved: 2021-09-29

## 2021-09-29 LAB
B-HCG UR QL: POSITIVE
INTERNAL NEGATIVE CONTROL: NEGATIVE
INTERNAL POSITIVE CONTROL: POSITIVE
Lab: ABNORMAL

## 2021-09-29 PROCEDURE — 87491 CHLMYD TRACH DNA AMP PROBE: CPT | Performed by: NURSE PRACTITIONER

## 2021-09-29 PROCEDURE — 86901 BLOOD TYPING SEROLOGIC RH(D): CPT | Performed by: NURSE PRACTITIONER

## 2021-09-29 PROCEDURE — 86762 RUBELLA ANTIBODY: CPT | Performed by: NURSE PRACTITIONER

## 2021-09-29 PROCEDURE — 87086 URINE CULTURE/COLONY COUNT: CPT | Performed by: NURSE PRACTITIONER

## 2021-09-29 PROCEDURE — 81003 URINALYSIS AUTO W/O SCOPE: CPT | Performed by: NURSE PRACTITIONER

## 2021-09-29 PROCEDURE — 87661 TRICHOMONAS VAGINALIS AMPLIF: CPT | Performed by: NURSE PRACTITIONER

## 2021-09-29 PROCEDURE — 80306 DRUG TEST PRSMV INSTRMNT: CPT | Performed by: NURSE PRACTITIONER

## 2021-09-29 PROCEDURE — 86803 HEPATITIS C AB TEST: CPT | Performed by: NURSE PRACTITIONER

## 2021-09-29 PROCEDURE — 86900 BLOOD TYPING SEROLOGIC ABO: CPT | Performed by: NURSE PRACTITIONER

## 2021-09-29 PROCEDURE — 86850 RBC ANTIBODY SCREEN: CPT | Performed by: NURSE PRACTITIONER

## 2021-09-29 PROCEDURE — 0501F PRENATAL FLOW SHEET: CPT | Performed by: NURSE PRACTITIONER

## 2021-09-29 PROCEDURE — 80081 OBSTETRIC PANEL INC HIV TSTG: CPT | Performed by: NURSE PRACTITIONER

## 2021-09-29 PROCEDURE — 81025 URINE PREGNANCY TEST: CPT | Performed by: NURSE PRACTITIONER

## 2021-09-29 PROCEDURE — 87591 N.GONORRHOEAE DNA AMP PROB: CPT | Performed by: NURSE PRACTITIONER

## 2021-09-29 RX ORDER — VITAMIN A, VITAMIN C, VITAMIN D-3, VITAMIN E, VITAMIN B-1, VITAMIN B-2, NIACIN, VITAMIN B-6, CALCIUM, IRON, ZINC, COPPER 4000; 120; 400; 22; 1.84; 3; 20; 10; 1; 12; 200; 27; 25; 2 [IU]/1; MG/1; [IU]/1; MG/1; MG/1; MG/1; MG/1; MG/1; MG/1; UG/1; MG/1; MG/1; MG/1; MG/1
1 TABLET ORAL DAILY
Qty: 90 TABLET | Refills: 4 | Status: SHIPPED | OUTPATIENT
Start: 2021-09-29 | End: 2022-06-06

## 2021-09-29 RX ORDER — LANOLIN ALCOHOL/MO/W.PET/CERES
50 CREAM (GRAM) TOPICAL DAILY
Qty: 90 TABLET | Refills: 4 | Status: SHIPPED | OUTPATIENT
Start: 2021-09-29 | End: 2022-05-09

## 2021-09-29 NOTE — PROGRESS NOTES
[facility]  Obstetrics Visit    CHIEF COMPLAINT:  New prenatal visit    HISTORY OF PRESENT ILLNESS:  Vianca Jules is a 21 y.o. y/o  at roughly 12 weeks by estimated LMP (Patient's last menstrual period was 2021.).  This was a planned pregnancy and the patient is supported by  Shant.  Reports nausea with some vomiting. She denies any vaginal bleeding.  She has not started taking a prenatal vitamin.    REVIEW OF SYSTEMS  Review of Systems   Constitutional: Positive for fatigue. Negative for activity change, appetite change, chills, diaphoresis, fever, unexpected weight gain and unexpected weight loss.   Respiratory: Negative for chest tightness and shortness of breath.    Cardiovascular: Negative for chest pain and palpitations.   Gastrointestinal: Positive for nausea and vomiting. Negative for abdominal distention, abdominal pain, constipation and diarrhea.   Endocrine: Negative.    Genitourinary: Negative for amenorrhea, breast discharge, breast lump, breast pain, decreased libido, decreased urine volume, difficulty urinating, dyspareunia, dysuria, frequency, menstrual problem, pelvic pain, pelvic pressure, urgency, urinary incontinence, vaginal bleeding, vaginal discharge and vaginal pain.   Musculoskeletal: Negative for myalgias.   Skin: Negative for color change, dry skin and skin lesions.   Neurological: Negative for light-headedness and headache.   Psychiatric/Behavioral: Negative for agitation, dysphoric mood, sleep disturbance, depressed mood and stress. The patient is not nervous/anxious.         EDPS- 8       PRENATAL RISK FACTORS   Problems (from 21 to present)     Problem Noted Resolved    Primigravida in first trimester 2021 by Kassi Rose, APRN No          DATING CRITERIA:  LMP (21) -- SHER: 2022   1TUS not done yet    OBSTETRIC HISTORY:  OB History    Para Term  AB Living   1             SAB TAB Ectopic Molar Multiple Live  Births                    # Outcome Date GA Lbr Ramakrishna/2nd Weight Sex Delivery Anes PTL Lv   1 Current              GYN HISTORY:  Denies h/o sexually transmitted infections/pelvic inflammatory disease  Denies h/o abnormal pap smears  Last pap smear:  normal  Last Completed Pap Smear          PAP SMEAR (Every 3 Years) Next due on 2019  Liquid-based Pap Smear, Screening              Denies h/o gynecologic surgeries, including biopsies of the cervix    PAST MEDICAL HISTORY:  Past Medical History:   Diagnosis Date   • Acute dermatitis      buttocks      • Acute gastroenteritis    • Acute otitis media, right    • Allergic rhinitis    • Anxiety    • Candidiasis of skin      caitlin-anal area      • Depression    • Lymphadenopathy    • Migraine    • Otitis media, chronic, bilateral    • Ovarian cyst    • Pelvic pain    • Upper respiratory infection    • Urinary tract infection      PAST SURGICAL HISTORY:  Past Surgical History:   Procedure Laterality Date   • CYST REMOVAL Left     L arm cyst removed     FAMILY HISTORY:  Family History   Problem Relation Age of Onset   • Thyroid disease Maternal Grandmother    • Diabetes Paternal Grandmother      SOCIAL HISTORY:  Social History     Socioeconomic History   • Marital status:      Spouse name: Not on file   • Number of children: Not on file   • Years of education: Not on file   • Highest education level: Not on file   Tobacco Use   • Smoking status: Former Smoker     Packs/day: 0.00     Years: 3.00     Pack years: 0.00     Types: Electronic Cigarette     Quit date: 2019     Years since quittin.1   • Smokeless tobacco: Current User     Types: Chew   • Tobacco comment: prior hx but quit approx mid Aug   Substance and Sexual Activity   • Alcohol use: Not Currently     Alcohol/week: 0.0 standard drinks     Comment: 1-2 beers per month   • Drug use: No   • Sexual activity: Yes     Partners: Male     Birth control/protection: None     GENETIC  SCREENING:  Age >36 yo as of SHER: no  Thalassemia: no  NTD: no  CHD: no  Down Syndrome/MR/Fragile X/Autism: no  [employer] with Andrzej-Sachs, [place], familial dysautonomia: no  Sickle cell disease or trait: no  Hemophilia: no  Muscular dystrophy: no  Cystic fibrosis: no  Jose's chorea: no  Birth defects: no  Genetic/chromosomal disorders: no    INFECTION HISTORY:  TB exposure: no  HSV: no  Illness since LMP: no  Prior GBS infected child: no  STIs: no    ALLERGIES:  No Known Allergies    MEDICATIONS:  Prior to Admission medications    Medication Sig Start Date End Date Taking? Authorizing Provider   Blisovi FE  1-20 MG-MCG per tablet Take 1 tablet by mouth Daily. 20   Kassi Rose, SEJAL       PHYSICAL EXAM:   /64   Wt 63.5 kg (140 lb)   LMP 2021   BMI 22.60 kg/m²   General: Alert, healthy, no distress, well nourished and well developed.  Neurologic: Alert, oriented to person, place, and time.  Gait normal.  Cranial nerves II-XII grossly intact.  HEENT: Normocephalic, atraumatic.  Extraocular muscles intact, pupils equal and reactive x2.    Teeth: Normal hygiene.  Neck: Supple, no adenopathy, thyroid normal size, non-tender, without nodularity, trachea midline.  Breasts: No masses, skin dimpling, skin retraction, nipple discharge, or asymmetry bilaterally.  Lungs: Normal respiratory effort.  Clear to auscultation bilaterally.  No wheezes, rhonci, or rales.  Heart: Regular rate and rhythm.  No murmer, rub or gallop.  Abdomen: Soft, non-tender, non-distended,no masses, no hepatosplenomegaly, no hernia.  Skin: No rash, no lesions.  Extremities: No cyanosis, clubbing or edema.  PELVIC EXAM:  Deferred    Bedside VScan performed by myself which shows the findings below:  FHR visualized    IMPRESSION:  Vianca Jules is a 21 y.o.  at 12w0d for a new prenatal visit.    PLAN:  1.  IUP at 12w0wd  - Options counseling performed and patient desires continuation of pregnancy to term   -  Prenatal labs ordered  - Genetic testing, including cystic fibrosis, was discussed and patient  - Start prenatal vitamins  - Weight gain counseling performed.   - Pregravid BMI 18.5-24.9: Recommend 25-35 lb   - Return to clinic in 4 weeks for return prenatal visit  - Reviewed COVID-19 visitation policy  - Reviewed COVID-19 precautions     Diagnosis Plan   1. Unsure of LMP (last menstrual period) as reason for ultrasound scan  OB Panel With HIV    Urine Culture - Urine, Urine, Clean Catch    Urine Drug Screen - Urine, Clean Catch    Urinalysis without microscopic (no culture) - Urine, Clean Catch    US Ob Transvaginal    Chlamydia trachomatis, Neisseria gonorrhoeae, Trichomonas vaginalis, PCR - Urine, Urine, Clean Catch    POC Pregnancy, Urine    Urine Culture - Urine, Urine, Clean Catch    Urine Drug Screen - Urine, Clean Catch    Urinalysis without microscopic (no culture) - Urine, Clean Catch   2. Encounter for pregnancy test, result positive  OB Panel With HIV    Urine Culture - Urine, Urine, Clean Catch    Urine Drug Screen - Urine, Clean Catch    Urinalysis without microscopic (no culture) - Urine, Clean Catch    US Ob Transvaginal    Chlamydia trachomatis, Neisseria gonorrhoeae, Trichomonas vaginalis, PCR - Urine, Urine, Clean Catch    POC Pregnancy, Urine    Urine Culture - Urine, Urine, Clean Catch    Urine Drug Screen - Urine, Clean Catch    Urinalysis without microscopic (no culture) - Urine, Clean Catch   3. Genetic screening  [employer] CARRIER SCREENING    Non-invasive Prenatal Testing   4. 12 weeks gestation of pregnancy     5. Primigravida in first trimester       Follow up for dating ultrasound and next scheduled OB visit.   Kassi Rose, SEJAL  9/29/2021  14:24 CDT

## 2021-09-30 PROBLEM — F12.10 MARIJUANA ABUSE: Status: ACTIVE | Noted: 2021-09-30

## 2021-09-30 PROBLEM — O99.321 HIGH RISK PREGNANCY DUE TO MATERNAL DRUG ABUSE IN FIRST TRIMESTER (HCC): Status: ACTIVE | Noted: 2021-09-30

## 2021-09-30 PROBLEM — Z67.91 RH NEGATIVE STATUS DURING PREGNANCY, FIRST TRIMESTER: Status: ACTIVE | Noted: 2021-09-30

## 2021-09-30 PROBLEM — O26.891 RH NEGATIVE STATUS DURING PREGNANCY, FIRST TRIMESTER: Status: ACTIVE | Noted: 2021-09-30

## 2021-09-30 PROBLEM — F19.10 HIGH RISK PREGNANCY DUE TO MATERNAL DRUG ABUSE IN FIRST TRIMESTER (HCC): Status: ACTIVE | Noted: 2021-09-30

## 2021-09-30 LAB
ABO GROUP BLD: NORMAL
AMPHET+METHAMPHET UR QL: NEGATIVE
AMPHETAMINES UR QL: NEGATIVE
BARBITURATES UR QL SCN: NEGATIVE
BASOPHILS # BLD AUTO: 0.03 10*3/MM3 (ref 0–0.2)
BASOPHILS NFR BLD AUTO: 0.3 % (ref 0–1.5)
BENZODIAZ UR QL SCN: NEGATIVE
BILIRUB UR QL STRIP: NEGATIVE
BLD GP AB SCN SERPL QL: NEGATIVE
BUPRENORPHINE SERPL-MCNC: NEGATIVE NG/ML
CANNABINOIDS SERPL QL: POSITIVE
CLARITY UR: ABNORMAL
COCAINE UR QL: NEGATIVE
COLOR UR: ABNORMAL
DEPRECATED RDW RBC AUTO: 43.3 FL (ref 37–54)
EOSINOPHIL # BLD AUTO: 0.11 10*3/MM3 (ref 0–0.4)
EOSINOPHIL NFR BLD AUTO: 1.1 % (ref 0.3–6.2)
ERYTHROCYTE [DISTWIDTH] IN BLOOD BY AUTOMATED COUNT: 13.2 % (ref 12.3–15.4)
GLUCOSE UR STRIP-MCNC: NEGATIVE MG/DL
HBV SURFACE AG SERPL QL IA: NORMAL
HCT VFR BLD AUTO: 40.1 % (ref 34–46.6)
HCV AB SER DONR QL: NORMAL
HGB BLD-MCNC: 13.7 G/DL (ref 12–15.9)
HGB UR QL STRIP.AUTO: NEGATIVE
HIV1+2 AB SER QL: NORMAL
HOLD SPECIMEN: NORMAL
IMM GRANULOCYTES # BLD AUTO: 0.04 10*3/MM3 (ref 0–0.05)
IMM GRANULOCYTES NFR BLD AUTO: 0.4 % (ref 0–0.5)
KETONES UR QL STRIP: ABNORMAL
LEUKOCYTE ESTERASE UR QL STRIP.AUTO: ABNORMAL
LYMPHOCYTES # BLD AUTO: 2.21 10*3/MM3 (ref 0.7–3.1)
LYMPHOCYTES NFR BLD AUTO: 21.4 % (ref 19.6–45.3)
Lab: NORMAL
MCH RBC QN AUTO: 30.2 PG (ref 26.6–33)
MCHC RBC AUTO-ENTMCNC: 34.2 G/DL (ref 31.5–35.7)
MCV RBC AUTO: 88.5 FL (ref 79–97)
METHADONE UR QL SCN: NEGATIVE
MONOCYTES # BLD AUTO: 0.51 10*3/MM3 (ref 0.1–0.9)
MONOCYTES NFR BLD AUTO: 4.9 % (ref 5–12)
NEUTROPHILS NFR BLD AUTO: 7.44 10*3/MM3 (ref 1.7–7)
NEUTROPHILS NFR BLD AUTO: 71.9 % (ref 42.7–76)
NITRITE UR QL STRIP: NEGATIVE
NRBC BLD AUTO-RTO: 0 /100 WBC (ref 0–0.2)
OPIATES UR QL: NEGATIVE
OXYCODONE UR QL SCN: NEGATIVE
PCP UR QL SCN: NEGATIVE
PH UR STRIP.AUTO: 5.5 [PH] (ref 5–8)
PLATELET # BLD AUTO: 206 10*3/MM3 (ref 140–450)
PMV BLD AUTO: 11.1 FL (ref 6–12)
PROPOXYPH UR QL: NEGATIVE
PROT UR QL STRIP: ABNORMAL
RBC # BLD AUTO: 4.53 10*6/MM3 (ref 3.77–5.28)
RH BLD: NEGATIVE
SP GR UR STRIP: 1.03 (ref 1–1.03)
TRICYCLICS UR QL SCN: NEGATIVE
UROBILINOGEN UR QL STRIP: ABNORMAL
WBC # BLD AUTO: 10.34 10*3/MM3 (ref 3.4–10.8)

## 2021-09-30 PROCEDURE — 36415 COLL VENOUS BLD VENIPUNCTURE: CPT | Performed by: NURSE PRACTITIONER

## 2021-10-01 LAB
BACTERIA SPEC AEROBE CULT: NO GROWTH
C TRACH RRNA CVX QL NAA+PROBE: NEGATIVE
N GONORRHOEA RRNA SPEC QL NAA+PROBE: NEGATIVE
RPR SER QL: NORMAL
RUBV IGG SERPL IA-ACNC: 5.48 INDEX
TRICHOMONAS VAGINALIS PCR: NEGATIVE

## 2021-10-04 DIAGNOSIS — Z32.01 ENCOUNTER FOR PREGNANCY TEST, RESULT POSITIVE: ICD-10-CM

## 2021-10-04 DIAGNOSIS — Z36.87 UNSURE OF LMP (LAST MENSTRUAL PERIOD) AS REASON FOR ULTRASOUND SCAN: ICD-10-CM

## 2021-10-14 ENCOUNTER — TELEPHONE (OUTPATIENT)
Dept: OBSTETRICS AND GYNECOLOGY | Facility: CLINIC | Age: 21
End: 2021-10-14

## 2021-10-14 PROBLEM — O09.892 CYSTIC FIBROSIS CARRIER IN SECOND TRIMESTER, ANTEPARTUM: Status: ACTIVE | Noted: 2021-10-14

## 2021-10-14 PROBLEM — Z14.1 CYSTIC FIBROSIS CARRIER IN SECOND TRIMESTER, ANTEPARTUM: Status: ACTIVE | Noted: 2021-10-14

## 2021-10-14 NOTE — TELEPHONE ENCOUNTER
Discussed results with pt. CF carrier status and genetic screening results reviewed. Pt wants to find out gender with her  at their next visit. Shant to come to [facility] Monday or Wednesday to have carrier testing drawn.

## 2021-10-27 ENCOUNTER — ROUTINE PRENATAL (OUTPATIENT)
Dept: OBSTETRICS AND GYNECOLOGY | Facility: CLINIC | Age: 21
End: 2021-10-27

## 2021-10-27 VITALS — DIASTOLIC BLOOD PRESSURE: 78 MMHG | BODY MASS INDEX: 24.05 KG/M2 | SYSTOLIC BLOOD PRESSURE: 118 MMHG | WEIGHT: 149 LBS

## 2021-10-27 DIAGNOSIS — Z34.02 PRIMIGRAVIDA IN SECOND TRIMESTER: Primary | ICD-10-CM

## 2021-10-27 DIAGNOSIS — F19.10 HIGH RISK PREGNANCY DUE TO MATERNAL DRUG ABUSE IN FIRST TRIMESTER (HCC): ICD-10-CM

## 2021-10-27 DIAGNOSIS — O26.891 RH NEGATIVE STATUS DURING PREGNANCY, FIRST TRIMESTER: ICD-10-CM

## 2021-10-27 DIAGNOSIS — Z67.91 RH NEGATIVE STATUS DURING PREGNANCY, FIRST TRIMESTER: ICD-10-CM

## 2021-10-27 DIAGNOSIS — O09.892 CYSTIC FIBROSIS CARRIER IN SECOND TRIMESTER, ANTEPARTUM: ICD-10-CM

## 2021-10-27 DIAGNOSIS — Z14.1 CYSTIC FIBROSIS CARRIER IN SECOND TRIMESTER, ANTEPARTUM: ICD-10-CM

## 2021-10-27 DIAGNOSIS — Z3A.14 14 WEEKS GESTATION OF PREGNANCY: ICD-10-CM

## 2021-10-27 DIAGNOSIS — O99.321 HIGH RISK PREGNANCY DUE TO MATERNAL DRUG ABUSE IN FIRST TRIMESTER (HCC): ICD-10-CM

## 2021-10-27 DIAGNOSIS — F12.10 MARIJUANA ABUSE: ICD-10-CM

## 2021-10-27 PROCEDURE — 0502F SUBSEQUENT PRENATAL CARE: CPT | Performed by: NURSE PRACTITIONER

## 2021-10-27 NOTE — PROGRESS NOTES
CC: Prenatal visit    Vianca Jules is a 21 y.o.  at 14w4d.  Doing well.  Denies dysuria, abnormal vaginal d/c, headaches, constipation, cramping, LOF, or VB.  Reports mild heartburn. Nausea relieved by Vitamin B6.     /78   Wt 67.6 kg (149 lb)   LMP 2021   BMI 24.05 kg/m²   SVE: n/a      Carrier screening (CFTR) for partner, Drew- negative.   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  at 14w4d.  - RTC in 4 weeks  - Reviewed COVID-19 visitation policy  - Reviewed COVID-19 precautions     Diagnosis Plan   1. Primigravida in second trimester     2. 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 Pt and partner v/u.   3. Rh negative status during pregnancy, first trimester     4. Marijuana abuse     5. High risk pregnancy due to maternal drug abuse in first trimester (HCC)     6. 14 weeks gestation of pregnancy       Education given on interpretation of carrier screening, patient and partner verbalized understanding.   SEJAL Mahmood  10/27/2021  13:25 CDT

## 2021-11-29 ENCOUNTER — ROUTINE PRENATAL (OUTPATIENT)
Dept: OBSTETRICS AND GYNECOLOGY | Facility: CLINIC | Age: 21
End: 2021-11-29

## 2021-11-29 VITALS — WEIGHT: 163 LBS | BODY MASS INDEX: 26.31 KG/M2 | SYSTOLIC BLOOD PRESSURE: 118 MMHG | DIASTOLIC BLOOD PRESSURE: 70 MMHG

## 2021-11-29 DIAGNOSIS — Z3A.19 19 WEEKS GESTATION OF PREGNANCY: ICD-10-CM

## 2021-11-29 DIAGNOSIS — O99.322 HIGH RISK PREGNANCY DUE TO MATERNAL DRUG ABUSE IN SECOND TRIMESTER (HCC): ICD-10-CM

## 2021-11-29 DIAGNOSIS — R12 HEARTBURN DURING PREGNANCY IN SECOND TRIMESTER: Primary | ICD-10-CM

## 2021-11-29 DIAGNOSIS — F12.10 MARIJUANA ABUSE: ICD-10-CM

## 2021-11-29 DIAGNOSIS — O26.892 HEARTBURN DURING PREGNANCY IN SECOND TRIMESTER: Primary | ICD-10-CM

## 2021-11-29 DIAGNOSIS — Z67.91 RH NEGATIVE STATUS DURING PREGNANCY IN SECOND TRIMESTER: ICD-10-CM

## 2021-11-29 DIAGNOSIS — Z34.02 PRIMIGRAVIDA IN SECOND TRIMESTER: ICD-10-CM

## 2021-11-29 DIAGNOSIS — Z14.1 CYSTIC FIBROSIS CARRIER IN SECOND TRIMESTER, ANTEPARTUM: ICD-10-CM

## 2021-11-29 DIAGNOSIS — O09.892 CYSTIC FIBROSIS CARRIER IN SECOND TRIMESTER, ANTEPARTUM: ICD-10-CM

## 2021-11-29 DIAGNOSIS — O26.892 RH NEGATIVE STATUS DURING PREGNANCY IN SECOND TRIMESTER: ICD-10-CM

## 2021-11-29 DIAGNOSIS — Z36.2 ENCOUNTER FOR OTHER ANTENATAL SCREENING FOLLOW-UP: ICD-10-CM

## 2021-11-29 DIAGNOSIS — F19.10 HIGH RISK PREGNANCY DUE TO MATERNAL DRUG ABUSE IN SECOND TRIMESTER (HCC): ICD-10-CM

## 2021-11-29 PROCEDURE — 0502F SUBSEQUENT PRENATAL CARE: CPT | Performed by: NURSE PRACTITIONER

## 2021-11-29 RX ORDER — FAMOTIDINE 20 MG/1
20 TABLET, FILM COATED ORAL 2 TIMES DAILY
Qty: 6 TABLET | Refills: 6 | Status: SHIPPED | OUTPATIENT
Start: 2021-11-29 | End: 2021-12-07 | Stop reason: SDUPTHER

## 2021-11-30 DIAGNOSIS — Z14.1 CYSTIC FIBROSIS CARRIER IN SECOND TRIMESTER, ANTEPARTUM: Primary | ICD-10-CM

## 2021-11-30 DIAGNOSIS — O09.892 CYSTIC FIBROSIS CARRIER IN SECOND TRIMESTER, ANTEPARTUM: Primary | ICD-10-CM

## 2021-11-30 DIAGNOSIS — O99.322 HIGH RISK PREGNANCY DUE TO MATERNAL DRUG ABUSE IN SECOND TRIMESTER (HCC): ICD-10-CM

## 2021-11-30 DIAGNOSIS — Z14.1 CYSTIC FIBROSIS CARRIER IN SECOND TRIMESTER, ANTEPARTUM: ICD-10-CM

## 2021-11-30 DIAGNOSIS — F19.10 HIGH RISK PREGNANCY DUE TO MATERNAL DRUG ABUSE IN SECOND TRIMESTER (HCC): ICD-10-CM

## 2021-11-30 DIAGNOSIS — O09.892 CYSTIC FIBROSIS CARRIER IN SECOND TRIMESTER, ANTEPARTUM: ICD-10-CM

## 2021-11-30 PROBLEM — Z34.02 PRIMIGRAVIDA IN SECOND TRIMESTER: Status: ACTIVE | Noted: 2021-09-29

## 2021-11-30 PROBLEM — O26.892 RH NEGATIVE STATUS DURING PREGNANCY IN SECOND TRIMESTER: Status: ACTIVE | Noted: 2021-09-30

## 2021-12-07 ENCOUNTER — TELEPHONE (OUTPATIENT)
Dept: OBSTETRICS AND GYNECOLOGY | Facility: CLINIC | Age: 21
End: 2021-12-07

## 2021-12-07 RX ORDER — FAMOTIDINE 20 MG/1
20 TABLET, FILM COATED ORAL 2 TIMES DAILY
Qty: 60 TABLET | Refills: 6 | Status: SHIPPED | OUTPATIENT
Start: 2021-12-07 | End: 2022-05-09

## 2021-12-22 ENCOUNTER — ROUTINE PRENATAL (OUTPATIENT)
Dept: OBSTETRICS AND GYNECOLOGY | Facility: CLINIC | Age: 21
End: 2021-12-22

## 2021-12-22 VITALS — SYSTOLIC BLOOD PRESSURE: 104 MMHG | BODY MASS INDEX: 27.92 KG/M2 | WEIGHT: 173 LBS | DIASTOLIC BLOOD PRESSURE: 66 MMHG

## 2021-12-22 DIAGNOSIS — Z34.02 PRIMIGRAVIDA IN SECOND TRIMESTER: ICD-10-CM

## 2021-12-22 DIAGNOSIS — Z14.1 CYSTIC FIBROSIS CARRIER IN SECOND TRIMESTER, ANTEPARTUM: Primary | ICD-10-CM

## 2021-12-22 DIAGNOSIS — Z3A.22 22 WEEKS GESTATION OF PREGNANCY: ICD-10-CM

## 2021-12-22 DIAGNOSIS — Z67.91 RH NEGATIVE STATUS DURING PREGNANCY IN SECOND TRIMESTER: ICD-10-CM

## 2021-12-22 DIAGNOSIS — F12.10 MARIJUANA ABUSE: ICD-10-CM

## 2021-12-22 DIAGNOSIS — O99.322 HIGH RISK PREGNANCY DUE TO MATERNAL DRUG ABUSE IN SECOND TRIMESTER (HCC): ICD-10-CM

## 2021-12-22 DIAGNOSIS — O09.892 CYSTIC FIBROSIS CARRIER IN SECOND TRIMESTER, ANTEPARTUM: Primary | ICD-10-CM

## 2021-12-22 DIAGNOSIS — O26.892 RH NEGATIVE STATUS DURING PREGNANCY IN SECOND TRIMESTER: ICD-10-CM

## 2021-12-22 DIAGNOSIS — F19.10 HIGH RISK PREGNANCY DUE TO MATERNAL DRUG ABUSE IN SECOND TRIMESTER (HCC): ICD-10-CM

## 2021-12-22 DIAGNOSIS — Z36.89 ENCOUNTER FOR OTHER SPECIFIED ANTENATAL SCREENING: ICD-10-CM

## 2021-12-22 PROCEDURE — 0502F SUBSEQUENT PRENATAL CARE: CPT | Performed by: NURSE PRACTITIONER

## 2021-12-22 NOTE — PROGRESS NOTES
CC: Prenatal visit    Vianca Jules is a 21 y.o.  at 22w4d.  Doing well.  Denies N/V, dysuria, abnormal vaginal d/c, headaches, heartburn, constipation, cramping, regular contractions, LOF, or VB.  Reports good FM.    /66   Wt 78.5 kg (173 lb)   LMP 2021   BMI 27.92 kg/m²   SVE: NA    Anatomy scan f/u reviewed. All anatomy seen per preliminary report. Size c/w dates. Will notify pt of need for any f/u per final report, when available.   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  at 22w4d.  - RTC in 4 weeks  - Reviewed COVID-19 visitation policy  - Reviewed COVID-19 precautions     Diagnosis Plan   1. 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 Rh negative status during pregnancy in second trimester     3. Marijuana abuse  Urine Drug Screen - Urine, Clean Catch   4. High risk pregnancy due to maternal drug abuse in second trimester (HCC)  Urine Drug Screen - Urine, Clean Catch   5. Primigravida in second trimester     6. 22 weeks gestation of pregnancy     7. Encounter for other specified  screening  CBC (No Diff)    Glucose, Post 50 Gm Glucola    Antibody Screen     SEJAL Mahmood  2021  13:25 CST

## 2021-12-28 DIAGNOSIS — Z36.2 ENCOUNTER FOR OTHER ANTENATAL SCREENING FOLLOW-UP: ICD-10-CM

## 2021-12-29 ENCOUNTER — TELEPHONE (OUTPATIENT)
Dept: OBSTETRICS AND GYNECOLOGY | Facility: CLINIC | Age: 21
End: 2021-12-29

## 2021-12-29 DIAGNOSIS — Z36.2 ENCOUNTER FOR OTHER ANTENATAL SCREENING FOLLOW-UP: Primary | ICD-10-CM

## 2021-12-29 NOTE — TELEPHONE ENCOUNTER
----- Message from Alexa Enamorado MD sent at 12/28/2021  2:32 PM CST -----  Final report shows suboptimal views but patient wasn't scheduled for repeat US.  Please let patient know that a few views were not seen and she needs another US.  Please schedule, next appt on 1/19 kenroy/ Kassi in [place].

## 2022-01-19 ENCOUNTER — ROUTINE PRENATAL (OUTPATIENT)
Dept: OBSTETRICS AND GYNECOLOGY | Facility: CLINIC | Age: 22
End: 2022-01-19

## 2022-01-19 ENCOUNTER — LAB (OUTPATIENT)
Dept: LAB | Facility: HOSPITAL | Age: 22
End: 2022-01-19

## 2022-01-19 VITALS — WEIGHT: 174 LBS | SYSTOLIC BLOOD PRESSURE: 110 MMHG | DIASTOLIC BLOOD PRESSURE: 68 MMHG | BODY MASS INDEX: 28.08 KG/M2

## 2022-01-19 DIAGNOSIS — F19.10 HIGH RISK PREGNANCY DUE TO MATERNAL DRUG ABUSE IN SECOND TRIMESTER: ICD-10-CM

## 2022-01-19 DIAGNOSIS — Z23 NEED FOR TDAP VACCINATION: ICD-10-CM

## 2022-01-19 DIAGNOSIS — O26.892 RH NEGATIVE STATUS DURING PREGNANCY IN SECOND TRIMESTER: Primary | ICD-10-CM

## 2022-01-19 DIAGNOSIS — F12.10 MARIJUANA ABUSE: ICD-10-CM

## 2022-01-19 DIAGNOSIS — Z34.02 PRIMIGRAVIDA IN SECOND TRIMESTER: ICD-10-CM

## 2022-01-19 DIAGNOSIS — Z67.91 RH NEGATIVE STATUS DURING PREGNANCY IN SECOND TRIMESTER: Primary | ICD-10-CM

## 2022-01-19 DIAGNOSIS — O99.322 HIGH RISK PREGNANCY DUE TO MATERNAL DRUG ABUSE IN SECOND TRIMESTER: ICD-10-CM

## 2022-01-19 DIAGNOSIS — Z3A.26 26 WEEKS GESTATION OF PREGNANCY: ICD-10-CM

## 2022-01-19 DIAGNOSIS — Z36.89 ENCOUNTER FOR OTHER SPECIFIED ANTENATAL SCREENING: ICD-10-CM

## 2022-01-19 PROCEDURE — 90471 IMMUNIZATION ADMIN: CPT | Performed by: NURSE PRACTITIONER

## 2022-01-19 PROCEDURE — 86850 RBC ANTIBODY SCREEN: CPT

## 2022-01-19 PROCEDURE — 90715 TDAP VACCINE 7 YRS/> IM: CPT | Performed by: NURSE PRACTITIONER

## 2022-01-19 PROCEDURE — 0502F SUBSEQUENT PRENATAL CARE: CPT | Performed by: NURSE PRACTITIONER

## 2022-01-19 PROCEDURE — 82950 GLUCOSE TEST: CPT

## 2022-01-19 PROCEDURE — 80306 DRUG TEST PRSMV INSTRMNT: CPT

## 2022-01-19 PROCEDURE — 85027 COMPLETE CBC AUTOMATED: CPT

## 2022-01-19 NOTE — PROGRESS NOTES
CC: Prenatal visit    Vianca Jules is a 21 y.o.  at 26w4d.  Doing well.  Denies N/V, dysuria, abnormal vaginal d/c, headaches, heartburn, constipation, cramping, regular contractions, LOF, or VB.  Reports good FM.    /68   Wt 78.9 kg (174 lb)   LMP 2021   BMI 28.08 kg/m²   SVE: NA    Missed f/u anatomy scan this AM. Rescheduled for Friday for subopts on heart.  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  at 26w4d.  - RTC in 2 weeks  - Reviewed COVID-19 visitation policy  - Reviewed COVID-19 precautions     Diagnosis Plan   1. Rh negative status during pregnancy in second trimester  Antibody screen today. Needs Rhogam injection next visit   2. Marijuana abuse  UDS today   3. High risk pregnancy due to maternal drug abuse in second trimester (HCC)     4. Primigravida in second trimester     5. 26 weeks gestation of pregnancy     6. Need for Tdap vaccination  Tdap Vaccine Greater Than or Equal To 8yo IM     SEJAL Mahmood  2022  10:48 CST

## 2022-01-20 LAB
AMPHET+METHAMPHET UR QL: NEGATIVE
AMPHETAMINES UR QL: NEGATIVE
BARBITURATES UR QL SCN: NEGATIVE
BENZODIAZ UR QL SCN: NEGATIVE
BLD GP AB SCN SERPL QL: NEGATIVE
BUPRENORPHINE SERPL-MCNC: NEGATIVE NG/ML
CANNABINOIDS SERPL QL: NEGATIVE
COCAINE UR QL: NEGATIVE
DEPRECATED RDW RBC AUTO: 41.7 FL (ref 37–54)
ERYTHROCYTE [DISTWIDTH] IN BLOOD BY AUTOMATED COUNT: 12.7 % (ref 12.3–15.4)
GLUCOSE 1H P 100 G GLC PO SERPL-MCNC: 121 MG/DL (ref 65–139)
HCT VFR BLD AUTO: 38 % (ref 34–46.6)
HGB BLD-MCNC: 12.5 G/DL (ref 12–15.9)
MCH RBC QN AUTO: 29.8 PG (ref 26.6–33)
MCHC RBC AUTO-ENTMCNC: 32.9 G/DL (ref 31.5–35.7)
MCV RBC AUTO: 90.7 FL (ref 79–97)
METHADONE UR QL SCN: NEGATIVE
OPIATES UR QL: NEGATIVE
OXYCODONE UR QL SCN: NEGATIVE
PCP UR QL SCN: NEGATIVE
PLATELET # BLD AUTO: 228 10*3/MM3 (ref 140–450)
PMV BLD AUTO: 11.1 FL (ref 6–12)
PROPOXYPH UR QL: NEGATIVE
RBC # BLD AUTO: 4.19 10*6/MM3 (ref 3.77–5.28)
TRICYCLICS UR QL SCN: NEGATIVE
WBC NRBC COR # BLD: 11.11 10*3/MM3 (ref 3.4–10.8)

## 2022-01-24 DIAGNOSIS — Z36.2 ENCOUNTER FOR OTHER ANTENATAL SCREENING FOLLOW-UP: ICD-10-CM

## 2022-02-02 ENCOUNTER — ROUTINE PRENATAL (OUTPATIENT)
Dept: OBSTETRICS AND GYNECOLOGY | Facility: CLINIC | Age: 22
End: 2022-02-02

## 2022-02-02 VITALS — DIASTOLIC BLOOD PRESSURE: 80 MMHG | BODY MASS INDEX: 29.7 KG/M2 | WEIGHT: 184 LBS | SYSTOLIC BLOOD PRESSURE: 126 MMHG

## 2022-02-02 DIAGNOSIS — O26.893 RH NEGATIVE STATUS DURING PREGNANCY IN THIRD TRIMESTER: Primary | ICD-10-CM

## 2022-02-02 DIAGNOSIS — O09.893 CYSTIC FIBROSIS CARRIER IN THIRD TRIMESTER, ANTEPARTUM: ICD-10-CM

## 2022-02-02 DIAGNOSIS — F19.10 HIGH RISK PREGNANCY DUE TO MATERNAL DRUG ABUSE IN THIRD TRIMESTER: ICD-10-CM

## 2022-02-02 DIAGNOSIS — Z14.1 CYSTIC FIBROSIS CARRIER IN THIRD TRIMESTER, ANTEPARTUM: ICD-10-CM

## 2022-02-02 DIAGNOSIS — Z3A.28 28 WEEKS GESTATION OF PREGNANCY: ICD-10-CM

## 2022-02-02 DIAGNOSIS — O99.323 HIGH RISK PREGNANCY DUE TO MATERNAL DRUG ABUSE IN THIRD TRIMESTER: ICD-10-CM

## 2022-02-02 DIAGNOSIS — Z67.91 RH NEGATIVE STATUS DURING PREGNANCY IN THIRD TRIMESTER: Primary | ICD-10-CM

## 2022-02-02 DIAGNOSIS — Z34.03 PRIMIGRAVIDA IN THIRD TRIMESTER: ICD-10-CM

## 2022-02-02 DIAGNOSIS — F12.10 MARIJUANA ABUSE: ICD-10-CM

## 2022-02-02 PROCEDURE — 0502F SUBSEQUENT PRENATAL CARE: CPT | Performed by: NURSE PRACTITIONER

## 2022-02-02 PROCEDURE — 96372 THER/PROPH/DIAG INJ SC/IM: CPT | Performed by: NURSE PRACTITIONER

## 2022-02-02 NOTE — PROGRESS NOTES
CC: Prenatal visit    Vianca Jules is a 21 y.o.  at 28w4d.  Doing well.  Denies N/V, dysuria, abnormal vaginal d/c, headaches, constipation, cramping, regular contractions, LOF, or VB.  Reports good FM. Heartburn well managed.     /80   Wt 83.5 kg (184 lb)   LMP 2021   BMI 29.70 kg/m²   SVE: NA  Has gained 10lbs in the past 2 weeks. Pt denies any dietary changes, headaches, severe swelling or vision changes.     Anatomy subopt scan from  reviewed with patient. All anatomy seen and noted to appear normal at that time.   Fundal Height (cm): 28 cm  Fetal Heart Rate: 125     Problems (from 21 to present)     Problem Noted Resolved    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, antepartum 10/14/2021 by Kassi Rose APRN No    Overview Signed 10/14/2021  3:36 PM by Kassi Rose APRN     CF carrier; FOB needs [employer] carrier screening         Rh negative status during pregnancy in third trimester 2021 by Kassi Rose APRN No    Marijuana abuse 2021 by Kassi Rose APRN No    High risk pregnancy due to maternal drug abuse in third trimester (HCC) 2021 by Kassi Rose APRN No    Primigravida in third trimester 2021 by Kassi Rose APRN No          A/P: Vianca Jules is a 21 y.o.  at 28w4d.  - RTC in 2 weeks  - Reviewed COVID-19 visitation policy  - Reviewed COVID-19 precautions     Diagnosis Plan   1. Rh negative status during pregnancy in third trimester  Rhogam given today.   2. 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 Marijuana abuse     4. High risk pregnancy due to maternal drug abuse in third trimester (HCC)     5. Primigravida in third trimester     6. 28 weeks gestation of pregnancy  Plans for Tdap next visit.    Weight gain discussed. Increase water intake. Limit excess salt/sodium intake. S/S PreE reviewed.     SEJAL Mahmood  2022  13:18 CST

## 2022-02-07 PROBLEM — O26.893 RH NEGATIVE STATUS DURING PREGNANCY IN THIRD TRIMESTER: Status: ACTIVE | Noted: 2021-09-30

## 2022-02-07 PROBLEM — O99.323 HIGH RISK PREGNANCY DUE TO MATERNAL DRUG ABUSE IN THIRD TRIMESTER (HCC): Status: ACTIVE | Noted: 2021-09-30

## 2022-02-07 PROBLEM — O09.893 CYSTIC FIBROSIS CARRIER IN THIRD TRIMESTER, ANTEPARTUM: Status: ACTIVE | Noted: 2021-10-14

## 2022-02-07 PROBLEM — Z34.03 PRIMIGRAVIDA IN THIRD TRIMESTER: Status: ACTIVE | Noted: 2021-09-29

## 2022-03-09 ENCOUNTER — ROUTINE PRENATAL (OUTPATIENT)
Dept: OBSTETRICS AND GYNECOLOGY | Facility: CLINIC | Age: 22
End: 2022-03-09

## 2022-03-09 VITALS — BODY MASS INDEX: 30.99 KG/M2 | WEIGHT: 192 LBS | SYSTOLIC BLOOD PRESSURE: 126 MMHG | DIASTOLIC BLOOD PRESSURE: 70 MMHG

## 2022-03-09 DIAGNOSIS — Z34.03 PRIMIGRAVIDA IN THIRD TRIMESTER: ICD-10-CM

## 2022-03-09 DIAGNOSIS — F19.10 HIGH RISK PREGNANCY DUE TO MATERNAL DRUG ABUSE IN THIRD TRIMESTER: ICD-10-CM

## 2022-03-09 DIAGNOSIS — Z14.1 CYSTIC FIBROSIS CARRIER IN THIRD TRIMESTER, ANTEPARTUM: ICD-10-CM

## 2022-03-09 DIAGNOSIS — O99.323 HIGH RISK PREGNANCY DUE TO MATERNAL DRUG ABUSE IN THIRD TRIMESTER: ICD-10-CM

## 2022-03-09 DIAGNOSIS — O26.893 HEARTBURN DURING PREGNANCY IN THIRD TRIMESTER: ICD-10-CM

## 2022-03-09 DIAGNOSIS — Z67.91 RH NEGATIVE STATUS DURING PREGNANCY IN THIRD TRIMESTER: Primary | ICD-10-CM

## 2022-03-09 DIAGNOSIS — Z3A.33 33 WEEKS GESTATION OF PREGNANCY: ICD-10-CM

## 2022-03-09 DIAGNOSIS — F12.11 MARIJUANA ABUSE IN REMISSION: ICD-10-CM

## 2022-03-09 DIAGNOSIS — O09.893 CYSTIC FIBROSIS CARRIER IN THIRD TRIMESTER, ANTEPARTUM: ICD-10-CM

## 2022-03-09 DIAGNOSIS — R12 HEARTBURN DURING PREGNANCY IN THIRD TRIMESTER: ICD-10-CM

## 2022-03-09 DIAGNOSIS — O26.893 RH NEGATIVE STATUS DURING PREGNANCY IN THIRD TRIMESTER: Primary | ICD-10-CM

## 2022-03-09 PROCEDURE — 0502F SUBSEQUENT PRENATAL CARE: CPT | Performed by: NURSE PRACTITIONER

## 2022-03-09 NOTE — PROGRESS NOTES
CC: Prenatal visit    Vianca Jules is a 22 y.o.  at 33w4d.  Doing well.  Denies N/V, dysuria, abnormal vaginal d/c, headaches, constipation, cramping, regular contractions, LOF, or VB.  Reports good FM. Heartburn well managed with Pepcid at this time.     /70   Wt 87.1 kg (192 lb)   LMP 2021   BMI 30.99 kg/m²   SVE: NA  Fundal Height (cm): 34 cm  Fetal Heart Rate: 150     Problems (from 21 to present)     Problem Noted Resolved    Heartburn during pregnancy in third trimester 3/9/2022 by Kassi Rose APRN No    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, antepartum 10/14/2021 by Kassi Rose APRN No    Overview Signed 10/14/2021  3:36 PM by Kassi Rose APRN     CF carrier; FOB needs [employer] carrier screening           Rh negative status during pregnancy in third trimester 2021 by Kassi Rose APRN No    Marijuana abuse in remission 2021 by Kassi Rose APRN No    High risk pregnancy due to maternal drug abuse in third trimester (HCC) 2021 by Kassi Rose APRN No    Primigravida in third trimester 2021 by Kassi Rose APRN No    Overview Signed 3/9/2022 10:51 AM by Kassi Rose APRN     A neg / Rubella immune / GBS 35-36wk  DatinT U/S  Genetics: NIPT negative/ carrier for CF gene, FOB is negative  Tdap: given 22  Flu: declined  Anatomy: WNL  1h Glucola: WNL  H&H/Plts:   Lab Results   Component Value Date    HGB 12.5 2022    HCT 38.0 2022     2022     Breast ? Bottle/BC?                 A/P: Vianca Jules is a 22 y.o.  at 33w4d.  - RTC in 2 weeks  - Reviewed COVID-19 visitation policy  - Reviewed COVID-19 precautions     Diagnosis Plan   1. Rh negative status during pregnancy in third trimester     2. Marijuana abuse in remission     3. High risk pregnancy due to maternal drug abuse in third trimester (HCC)     4. Primigravida in third trimester     5. 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, antepartum      6. Heartburn during pregnancy in third trimester     7. 33 weeks gestation of pregnancy  GBS next visit     Kassi Rose, SEJAL  3/9/2022  10:51 CST

## 2022-03-17 ENCOUNTER — ROUTINE PRENATAL (OUTPATIENT)
Dept: OBSTETRICS AND GYNECOLOGY | Facility: CLINIC | Age: 22
End: 2022-03-17

## 2022-03-17 VITALS — WEIGHT: 194.6 LBS | BODY MASS INDEX: 31.41 KG/M2 | SYSTOLIC BLOOD PRESSURE: 100 MMHG | DIASTOLIC BLOOD PRESSURE: 80 MMHG

## 2022-03-17 DIAGNOSIS — Z67.91 RH NEGATIVE STATUS DURING PREGNANCY IN THIRD TRIMESTER: ICD-10-CM

## 2022-03-17 DIAGNOSIS — R12 HEARTBURN DURING PREGNANCY IN THIRD TRIMESTER: ICD-10-CM

## 2022-03-17 DIAGNOSIS — Z3A.34 34 WEEKS GESTATION OF PREGNANCY: ICD-10-CM

## 2022-03-17 DIAGNOSIS — O26.893 RH NEGATIVE STATUS DURING PREGNANCY IN THIRD TRIMESTER: ICD-10-CM

## 2022-03-17 DIAGNOSIS — F12.11 MARIJUANA ABUSE IN REMISSION: ICD-10-CM

## 2022-03-17 DIAGNOSIS — O99.323 HIGH RISK PREGNANCY DUE TO MATERNAL DRUG ABUSE IN THIRD TRIMESTER: ICD-10-CM

## 2022-03-17 DIAGNOSIS — Z14.1 CYSTIC FIBROSIS CARRIER IN THIRD TRIMESTER, ANTEPARTUM: ICD-10-CM

## 2022-03-17 DIAGNOSIS — O09.893 CYSTIC FIBROSIS CARRIER IN THIRD TRIMESTER, ANTEPARTUM: ICD-10-CM

## 2022-03-17 DIAGNOSIS — O26.893 HEARTBURN DURING PREGNANCY IN THIRD TRIMESTER: ICD-10-CM

## 2022-03-17 DIAGNOSIS — F19.10 HIGH RISK PREGNANCY DUE TO MATERNAL DRUG ABUSE IN THIRD TRIMESTER: ICD-10-CM

## 2022-03-17 DIAGNOSIS — O09.93 SUPERVISION OF HIGH RISK PREGNANCY IN THIRD TRIMESTER: Primary | ICD-10-CM

## 2022-03-17 PROCEDURE — 0502F SUBSEQUENT PRENATAL CARE: CPT | Performed by: OBSTETRICS & GYNECOLOGY

## 2022-03-17 NOTE — PROGRESS NOTES
CC: Prenatal visit    Vianca Jules is a 22 y.o.  at 34w5d.  Doing well.  Denies contractions, LOF, or VB.  Reports good FM.    /80   Wt 88.3 kg (194 lb 9.6 oz)   LMP 2021   BMI 31.41 kg/m²   Fundal Height (cm): 36 cm  Fetal Heart Rate: 139     Problems (from 21 to present)     Problem Noted Resolved    Heartburn during pregnancy in third trimester 3/9/2022 by Kassi Rose APRN No    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, antepartum 10/14/2021 by Kassi Rose APRN No    Overview Signed 10/14/2021  3:36 PM by Kassi Rose APRN     CF carrier; FOB needs [employer] carrier screening           Rh negative status during pregnancy in third trimester 2021 by Kassi Rose APRN No    Marijuana abuse in remission 2021 by Kassi Rose APRN No    High risk pregnancy due to maternal drug abuse in third trimester (HCC) 2021 by Kassi Rose APRN No    Supervision of high risk pregnancy in third trimester 2021 by Kassi Rose APRN No    Overview Signed 3/9/2022 10:51 AM by Kassi Rose APRN     A neg / Rubella immune / GBS 35-36wk  DatinT U/S  Genetics: NIPT negative/ carrier for CF gene, FOB is negative  Tdap: given 22  Flu: declined  Anatomy: WNL  1h Glucola: WNL  H&H/Plts:   Lab Results   Component Value Date    HGB 12.5 2022    HCT 38.0 2022     2022     Breast ? Bottle/BC?               A/P: Vianca Jules is a 22 y.o.  at 34w5d.  - RTC in 2 weeks w/ GBS, BSUS  - Reviewed COVID-19 visitation policy  - Reviewed COVID-19 precautions     Diagnosis Plan   1. Supervision of high risk pregnancy in third trimester     2. 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 Rh negative status during pregnancy in third trimester     4. Marijuana abuse in remission     5. High risk pregnancy due to maternal drug abuse in third trimester (HCC)     6. Heartburn during pregnancy in third trimester     7. 34 weeks gestation  of pregnancy       Alexa Enamorado MD  3/17/2022  11:24 CDT

## 2022-04-01 ENCOUNTER — ROUTINE PRENATAL (OUTPATIENT)
Dept: OBSTETRICS AND GYNECOLOGY | Facility: CLINIC | Age: 22
End: 2022-04-01

## 2022-04-01 VITALS — SYSTOLIC BLOOD PRESSURE: 114 MMHG | WEIGHT: 200 LBS | BODY MASS INDEX: 32.28 KG/M2 | DIASTOLIC BLOOD PRESSURE: 72 MMHG

## 2022-04-01 DIAGNOSIS — F19.10 HIGH RISK PREGNANCY DUE TO MATERNAL DRUG ABUSE IN THIRD TRIMESTER: ICD-10-CM

## 2022-04-01 DIAGNOSIS — O26.893 RH NEGATIVE STATUS DURING PREGNANCY IN THIRD TRIMESTER: ICD-10-CM

## 2022-04-01 DIAGNOSIS — F12.11 MARIJUANA ABUSE IN REMISSION: ICD-10-CM

## 2022-04-01 DIAGNOSIS — O09.893 CYSTIC FIBROSIS CARRIER IN THIRD TRIMESTER, ANTEPARTUM: ICD-10-CM

## 2022-04-01 DIAGNOSIS — O99.323 HIGH RISK PREGNANCY DUE TO MATERNAL DRUG ABUSE IN THIRD TRIMESTER: ICD-10-CM

## 2022-04-01 DIAGNOSIS — R12 HEARTBURN DURING PREGNANCY IN THIRD TRIMESTER: ICD-10-CM

## 2022-04-01 DIAGNOSIS — O26.893 HEARTBURN DURING PREGNANCY IN THIRD TRIMESTER: ICD-10-CM

## 2022-04-01 DIAGNOSIS — Z3A.36 36 WEEKS GESTATION OF PREGNANCY: ICD-10-CM

## 2022-04-01 DIAGNOSIS — Z14.1 CYSTIC FIBROSIS CARRIER IN THIRD TRIMESTER, ANTEPARTUM: ICD-10-CM

## 2022-04-01 DIAGNOSIS — O09.93 SUPERVISION OF HIGH RISK PREGNANCY IN THIRD TRIMESTER: Primary | ICD-10-CM

## 2022-04-01 DIAGNOSIS — Z67.91 RH NEGATIVE STATUS DURING PREGNANCY IN THIRD TRIMESTER: ICD-10-CM

## 2022-04-01 PROCEDURE — 87653 STREP B DNA AMP PROBE: CPT | Performed by: OBSTETRICS & GYNECOLOGY

## 2022-04-01 PROCEDURE — 0502F SUBSEQUENT PRENATAL CARE: CPT | Performed by: OBSTETRICS & GYNECOLOGY

## 2022-04-02 LAB — GROUP B STREP, DNA: NEGATIVE

## 2022-04-03 NOTE — PROGRESS NOTES
CC: Prenatal visit    Vianca Jules is a 22 y.o.  at 36w6d.  Doing well.  Denies contractions, LOF, or VB.  Reports good FM.    /72   Wt 90.7 kg (200 lb)   LMP 2021   BMI 32.28 kg/m²   SVE: FT/70/-1/soft/posterior (cervical os posterior to fetal head which is low in the pelvis), vertex  BSUS: cephalic     Fetal Heart Rate: 138     Problems (from 21 to present)     Problem Noted Resolved    Heartburn during pregnancy in third trimester 3/9/2022 by Kassi Rose APRN No    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, antepartum 10/14/2021 by Kassi Rose APRN No    Overview Signed 10/14/2021  3:36 PM by Kassi Rose APRN     CF carrier; FOB needs [employer] carrier screening           Rh negative status during pregnancy in third trimester 2021 by Kassi Rose APRN No    Marijuana abuse in remission 2021 by Kassi Rose APRN No    High risk pregnancy due to maternal drug abuse in third trimester (HCC) 2021 by Kassi Rose APRN No    Supervision of high risk pregnancy in third trimester 2021 by Kassi Rose APRN No    Overview Signed 3/9/2022 10:51 AM by Kassi Rose APRN     A neg / Rubella immune / GBS 35-36wk  DatinT U/S  Genetics: NIPT negative/ carrier for CF gene, FOB is negative  Tdap: given 22  Flu: declined  Anatomy: WNL  1h Glucola: WNL  H&H/Plts:   Lab Results   Component Value Date    HGB 12.5 2022    HCT 38.0 2022     2022     Breast ? Bottle/BC?               A/P: Vianca Jules is a 22 y.o.  at 36w6d.  - RTC in 1 week  - GBS today  - Discussed EIOL at 39 wks vs IOL at 41 wks for late term gestation.  - Reviewed COVID-19 visitation policy  - Reviewed COVID-19 precautions     Diagnosis Plan   1. Supervision of high risk pregnancy in third trimester     2. 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 Rh negative status during pregnancy in third trimester     4. Marijuana abuse in remission      5. High risk pregnancy due to maternal drug abuse in third trimester (HCC)     6. Heartburn during pregnancy in third trimester     7. 36 weeks gestation of pregnancy  Group B Strep (Molecular) - Swab, Vaginal/Rectum     Alexa Enamorado MD  4/3/2022  11:51 CDT

## 2022-04-07 ENCOUNTER — ROUTINE PRENATAL (OUTPATIENT)
Dept: OBSTETRICS AND GYNECOLOGY | Facility: CLINIC | Age: 22
End: 2022-04-07

## 2022-04-07 VITALS — SYSTOLIC BLOOD PRESSURE: 110 MMHG | DIASTOLIC BLOOD PRESSURE: 72 MMHG | WEIGHT: 198.8 LBS | BODY MASS INDEX: 32.09 KG/M2

## 2022-04-07 DIAGNOSIS — R12 HEARTBURN DURING PREGNANCY IN THIRD TRIMESTER: ICD-10-CM

## 2022-04-07 DIAGNOSIS — Z14.1 CYSTIC FIBROSIS CARRIER IN THIRD TRIMESTER, ANTEPARTUM: ICD-10-CM

## 2022-04-07 DIAGNOSIS — F19.10 HIGH RISK PREGNANCY DUE TO MATERNAL DRUG ABUSE IN THIRD TRIMESTER: ICD-10-CM

## 2022-04-07 DIAGNOSIS — Z3A.37 37 WEEKS GESTATION OF PREGNANCY: ICD-10-CM

## 2022-04-07 DIAGNOSIS — Z67.91 RH NEGATIVE STATUS DURING PREGNANCY IN THIRD TRIMESTER: ICD-10-CM

## 2022-04-07 DIAGNOSIS — O09.93 SUPERVISION OF HIGH RISK PREGNANCY IN THIRD TRIMESTER: Primary | ICD-10-CM

## 2022-04-07 DIAGNOSIS — F12.11 MARIJUANA ABUSE IN REMISSION: ICD-10-CM

## 2022-04-07 DIAGNOSIS — O26.893 HEARTBURN DURING PREGNANCY IN THIRD TRIMESTER: ICD-10-CM

## 2022-04-07 DIAGNOSIS — O26.893 RH NEGATIVE STATUS DURING PREGNANCY IN THIRD TRIMESTER: ICD-10-CM

## 2022-04-07 DIAGNOSIS — O99.323 HIGH RISK PREGNANCY DUE TO MATERNAL DRUG ABUSE IN THIRD TRIMESTER: ICD-10-CM

## 2022-04-07 DIAGNOSIS — O09.893 CYSTIC FIBROSIS CARRIER IN THIRD TRIMESTER, ANTEPARTUM: ICD-10-CM

## 2022-04-07 PROCEDURE — 0502F SUBSEQUENT PRENATAL CARE: CPT | Performed by: OBSTETRICS & GYNECOLOGY

## 2022-04-07 NOTE — PROGRESS NOTES
CC: Prenatal visit    Vianca Jules is a 22 y.o.  at 37w5d.  Doing well.  Denies contractions, LOF, or VB.  Reports good FM.    /72   Wt 90.2 kg (198 lb 12.8 oz)   LMP 2021   BMI 32.09 kg/m²   SVE: Declined  Fundal Height (cm): 35 cm  Fetal Heart Rate: 142     Problems (from 21 to present)     Problem Noted Resolved    Heartburn during pregnancy in third trimester 3/9/2022 by Kassi Rose APRN No    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, antepartum 10/14/2021 by Kassi Rose APRN No    Overview Signed 10/14/2021  3:36 PM by Kassi Rose APRN     CF carrier; FOB needs [employer] carrier screening           Rh negative status during pregnancy in third trimester 2021 by Kassi Rose APRN No    Marijuana abuse in remission 2021 by Kassi Rose APRN No    High risk pregnancy due to maternal drug abuse in third trimester (HCC) 2021 by Kassi Rose APRN No    Supervision of high risk pregnancy in third trimester 2021 by Kassi Rose APRN No    Overview Signed 3/9/2022 10:51 AM by Kassi Rose APRN     A neg / Rubella immune / GBS 35-36wk  DatinT U/S  Genetics: NIPT negative/ carrier for CF gene, FOB is negative  Tdap: given 22  Flu: declined  Anatomy: WNL  1h Glucola: WNL  H&H/Plts:   Lab Results   Component Value Date    HGB 12.5 2022    HCT 38.0 2022     2022     Breast ? Bottle/BC?               A/P: Vianca Jules is a 22 y.o.  at 37w5d.  - RTC in 1 week  - Reviewed call schedule and labor precautions.  - Reviewed COVID-19 visitation policy  - Reviewed COVID-19 precautions     Diagnosis Plan   1. Supervision of high risk pregnancy in third trimester     2. 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 Rh negative status during pregnancy in third trimester     4. Marijuana abuse in remission     5. High risk pregnancy due to maternal drug abuse in third trimester (HCC)     6. Heartburn during  pregnancy in third trimester     7. 37 weeks gestation of pregnancy       Alexa Enamorado MD  4/7/2022  10:00 T

## 2022-04-15 ENCOUNTER — ROUTINE PRENATAL (OUTPATIENT)
Dept: OBSTETRICS AND GYNECOLOGY | Facility: CLINIC | Age: 22
End: 2022-04-15

## 2022-04-15 VITALS — DIASTOLIC BLOOD PRESSURE: 80 MMHG | BODY MASS INDEX: 32.44 KG/M2 | SYSTOLIC BLOOD PRESSURE: 122 MMHG | WEIGHT: 201 LBS

## 2022-04-15 DIAGNOSIS — O09.893 CYSTIC FIBROSIS CARRIER IN THIRD TRIMESTER, ANTEPARTUM: ICD-10-CM

## 2022-04-15 DIAGNOSIS — O26.893 HEARTBURN DURING PREGNANCY IN THIRD TRIMESTER: ICD-10-CM

## 2022-04-15 DIAGNOSIS — F12.11 MARIJUANA ABUSE IN REMISSION: ICD-10-CM

## 2022-04-15 DIAGNOSIS — O09.93 SUPERVISION OF HIGH RISK PREGNANCY IN THIRD TRIMESTER: Primary | ICD-10-CM

## 2022-04-15 DIAGNOSIS — R12 HEARTBURN DURING PREGNANCY IN THIRD TRIMESTER: ICD-10-CM

## 2022-04-15 DIAGNOSIS — O99.323 HIGH RISK PREGNANCY DUE TO MATERNAL DRUG ABUSE IN THIRD TRIMESTER: ICD-10-CM

## 2022-04-15 DIAGNOSIS — Z3A.38 38 WEEKS GESTATION OF PREGNANCY: ICD-10-CM

## 2022-04-15 DIAGNOSIS — F19.10 HIGH RISK PREGNANCY DUE TO MATERNAL DRUG ABUSE IN THIRD TRIMESTER: ICD-10-CM

## 2022-04-15 DIAGNOSIS — Z67.91 RH NEGATIVE STATUS DURING PREGNANCY IN THIRD TRIMESTER: ICD-10-CM

## 2022-04-15 DIAGNOSIS — O26.893 RH NEGATIVE STATUS DURING PREGNANCY IN THIRD TRIMESTER: ICD-10-CM

## 2022-04-15 DIAGNOSIS — Z14.1 CYSTIC FIBROSIS CARRIER IN THIRD TRIMESTER, ANTEPARTUM: ICD-10-CM

## 2022-04-15 PROCEDURE — 0502F SUBSEQUENT PRENATAL CARE: CPT | Performed by: OBSTETRICS & GYNECOLOGY

## 2022-04-17 RX ORDER — ACETAMINOPHEN 325 MG/1
1000 TABLET ORAL EVERY 6 HOURS
Status: CANCELLED | OUTPATIENT
Start: 2022-04-17

## 2022-04-17 RX ORDER — BUTORPHANOL TARTRATE 1 MG/ML
1 INJECTION, SOLUTION INTRAMUSCULAR; INTRAVENOUS
Status: CANCELLED | OUTPATIENT
Start: 2022-04-17

## 2022-04-17 RX ORDER — ONDANSETRON 4 MG/1
4 TABLET, FILM COATED ORAL EVERY 6 HOURS PRN
Status: CANCELLED | OUTPATIENT
Start: 2022-04-17

## 2022-04-17 RX ORDER — OXYTOCIN 10 [USP'U]/ML
650 INJECTION, SOLUTION INTRAMUSCULAR; INTRAVENOUS ONCE
Status: CANCELLED | OUTPATIENT
Start: 2022-04-17

## 2022-04-17 RX ORDER — PROMETHAZINE HYDROCHLORIDE 25 MG/1
12.5 SUPPOSITORY RECTAL EVERY 6 HOURS PRN
Status: CANCELLED | OUTPATIENT
Start: 2022-04-17

## 2022-04-17 RX ORDER — PROMETHAZINE HYDROCHLORIDE 25 MG/1
25 TABLET ORAL EVERY 6 HOURS PRN
Status: CANCELLED | OUTPATIENT
Start: 2022-04-17

## 2022-04-17 RX ORDER — CARBOPROST TROMETHAMINE 250 UG/ML
250 INJECTION, SOLUTION INTRAMUSCULAR AS NEEDED
Status: CANCELLED | OUTPATIENT
Start: 2022-04-17

## 2022-04-17 RX ORDER — SODIUM CHLORIDE, SODIUM LACTATE, POTASSIUM CHLORIDE, CALCIUM CHLORIDE 600; 310; 30; 20 MG/100ML; MG/100ML; MG/100ML; MG/100ML
125 INJECTION, SOLUTION INTRAVENOUS CONTINUOUS
Status: CANCELLED | OUTPATIENT
Start: 2022-04-17

## 2022-04-17 RX ORDER — ONDANSETRON 2 MG/ML
4 INJECTION INTRAMUSCULAR; INTRAVENOUS EVERY 6 HOURS PRN
Status: CANCELLED | OUTPATIENT
Start: 2022-04-17

## 2022-04-17 RX ORDER — LIDOCAINE HYDROCHLORIDE 10 MG/ML
5 INJECTION, SOLUTION EPIDURAL; INFILTRATION; INTRACAUDAL; PERINEURAL AS NEEDED
Status: CANCELLED | OUTPATIENT
Start: 2022-04-17

## 2022-04-17 RX ORDER — SODIUM CHLORIDE 0.9 % (FLUSH) 0.9 %
3 SYRINGE (ML) INJECTION EVERY 12 HOURS SCHEDULED
Status: CANCELLED | OUTPATIENT
Start: 2022-04-17

## 2022-04-17 RX ORDER — BUTORPHANOL TARTRATE 1 MG/ML
2 INJECTION, SOLUTION INTRAMUSCULAR; INTRAVENOUS
Status: CANCELLED | OUTPATIENT
Start: 2022-04-17

## 2022-04-17 RX ORDER — SODIUM CHLORIDE 0.9 % (FLUSH) 0.9 %
3-10 SYRINGE (ML) INJECTION AS NEEDED
Status: CANCELLED | OUTPATIENT
Start: 2022-04-17

## 2022-04-17 RX ORDER — METHYLERGONOVINE MALEATE 0.2 MG/ML
200 INJECTION INTRAVENOUS ONCE AS NEEDED
Status: CANCELLED | OUTPATIENT
Start: 2022-04-17

## 2022-04-17 RX ORDER — MISOPROSTOL 100 MCG
50 TABLET ORAL EVERY 6 HOURS
Status: CANCELLED | OUTPATIENT
Start: 2022-04-17 | End: 2022-04-18

## 2022-04-17 RX ORDER — OXYTOCIN 10 [USP'U]/ML
85 INJECTION, SOLUTION INTRAMUSCULAR; INTRAVENOUS ONCE
Status: CANCELLED | OUTPATIENT
Start: 2022-04-17

## 2022-04-17 RX ORDER — IBUPROFEN 200 MG
800 TABLET ORAL EVERY 8 HOURS
Status: CANCELLED | OUTPATIENT
Start: 2022-04-17

## 2022-04-17 RX ORDER — MISOPROSTOL 100 UG/1
800 TABLET ORAL AS NEEDED
Status: CANCELLED | OUTPATIENT
Start: 2022-04-17

## 2022-04-17 NOTE — H&P
[facility]  HISTORY & PHYSICAL - Obstetrics    Name: Vianca Jules  MRN: [id number]  Location: Room/bed info not found  Date: 04/15/2022  CSN: [id number]      CHIEF COMPLAINT:  EIOL    HISTORY OF PRESENT ILLNESS  Vianca Jules is a 22 y.o.  at 38w6d who is scheduled for EIOL at 40w0d.  Today denies congratulations, LOF, or VB.  Reports good FM    ROS  Review of Systems   Constitutional: Negative.    HENT: Negative.    Eyes: Negative.    Respiratory: Negative.    Cardiovascular: Negative.    Gastrointestinal: Negative.    Endocrine: Negative.    Genitourinary: Negative.    Musculoskeletal: Negative.    Skin: Negative.    Allergic/Immunologic: Negative.    Neurological: Negative.    Hematological: Negative.    Psychiatric/Behavioral: Negative.      PRENATAL LAB RESULTS  Prenatal labs reviewed  External Prenatal Results     Pregnancy Outside Results - Transcribed From Office Records - See Scanned Records For Details     Test Value Date Time    ABO  A  21 1407    Rh  Negative  21 1407    Antibody Screen  Negative  22 1052       Negative  21 1407    Varicella IgG       Rubella  5.48 index 21 1407    Hgb  12.5 g/dL 22 1052       13.7 g/dL 21 1407    Hct  38.0 % 22 1052       40.1 % 21 1407    Glucose Fasting GTT       Glucose Tolerance Test 1 hour       Glucose Tolerance Test 3 hour       Gonorrhea (discrete)  Negative  21 1410    Chlamydia (discrete)  Negative  21 1410    RPR  Non-Reactive  21 1407    VDRL       Syphilis Antibody       HBsAg  Non-Reactive  21 1407    Herpes Simplex Virus PCR       Herpes Simplex VIrus Culture       HIV  Non-Reactive  21 1407    Hep C RNA Quant PCR       Hep C Antibody  Non-Reactive  21 1407    AFP       Group B Strep  Negative  22    GBS Susceptibility to Clindamycin       GBS Susceptibility to Erythromycin       Fetal Fibronectin       Genetic Testing,  Maternal Blood             Drug Screening     Test Value Date Time    Urine Drug Screen       Amphetamine Screen  Negative  22 1052       Negative  21 1410    Barbiturate Screen  Negative  22 1052       Negative  21 1410    Benzodiazepine Screen  Negative  22 1052       Negative  21 1410    Methadone Screen  Negative  22 1052       Negative  21 1410    Phencyclidine Screen  Negative  22 1052       Negative  21 1410    Opiates Screen  Negative  22 1052       Negative  21 1410    THC Screen  Negative  22 1052       Positive  21 1410    Cocaine Screen       Propoxyphene Screen  Negative  22 1052       Negative  21 1410    Buprenorphine Screen  Negative  22 1052       Negative  21 1410    Methamphetamine Screen       Oxycodone Screen  Negative  22 1052       Negative  21 1410    Tricyclic Antidepressants Screen  Negative  22 1052       Negative  21 1410          Legend    ^: Historical                      PRENATAL RISK FACTORS   Problems (from 21 to present)     Problem Noted Resolved    Heartburn during pregnancy in third trimester 3/9/2022 by Kassi Rose APRN No    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, antepartum 10/14/2021 by Kassi Rose APRN No    Overview Signed 10/14/2021  3:36 PM by Kassi Rose APRN     CF carrier; FOB needs [employer] carrier screening           Rh negative status during pregnancy in third trimester 2021 by Kassi Rose APRN No    Marijuana abuse in remission 2021 by Kassi Rose APRN No    High risk pregnancy due to maternal drug abuse in third trimester (HCC) 2021 by Kassi Rose APRN No    Supervision of high risk pregnancy in third trimester 2021 by Kassi Rose APRN No    Overview Signed 3/9/2022 10:51 AM by Kassi Rose APRN     A neg / Rubella immune / GBS 35-36wk  DatinT U/S  Genetics: NIPT  negative/ carrier for CF gene, FOB is negative  Tdap: given 22  Flu: declined  Anatomy: WNL  1h Glucola: WNL  H&H/Plts:   Lab Results   Component Value Date    HGB 12.5 2022    HCT 38.0 2022     2022     Breast ? Bottle/BC?               OB HISTORY  OB History    Para Term  AB Living   1             SAB IAB Ectopic Molar Multiple Live Births                    # Outcome Date GA Lbr Ramakrishna/2nd Weight Sex Delivery Anes PTL Lv   1 Current              GYN HISTORY  Denies h/o sexually transmitted infections/pelvic inflammatory disease  Denies h/o gynecologic surgeries, including biopsies of the cervix    PAST MEDICAL HISTORY  Past Medical History:   Diagnosis Date   • Allergic rhinitis    • Anxiety    • Cystic fibrosis carrier 10/14/2021   • Depression    • Marijuana abuse 2021   • Migraine      PAST SURGICAL HISTORY  Past Surgical History:   Procedure Laterality Date   • CYST REMOVAL Left     L arm cyst removed     FAMILY HISTORY  Family History   Problem Relation Age of Onset   • Thyroid disease Maternal Grandmother    • Diabetes Paternal Grandmother      SOCIAL HISTORY  Social History     Socioeconomic History   • Marital status:    Tobacco Use   • Smoking status: Former Smoker     Packs/day: 0.00     Years: 3.00     Pack years: 0.00     Types: Electronic Cigarette     Quit date: 2019     Years since quittin.6   • Smokeless tobacco: Current User     Types: Chew   • Tobacco comment: prior hx but quit approx mid Aug   Substance and Sexual Activity   • Alcohol use: Not Currently     Alcohol/week: 0.0 standard drinks     Comment: 1-2 beers per month   • Drug use: No   • Sexual activity: Yes     Partners: Male     Birth control/protection: None     ALLERGIES  No Known Allergies    HOME MEDICATIONS  Prior to Admission medications    Medication Sig Start Date End Date Taking? Authorizing Provider   famotidine (Pepcid) 20 MG tablet Take 1 tablet by mouth 2 (Two)  Times a Day. 21 Yes Kassi Rose APRN   Prenatal Vit-Fe Fumarate-FA (Prenatal Vitamin Plus Low Iron) 27-1 MG tablet Take 1 tablet by mouth Daily. 21  Yes Kassi Rose APRN   vitamin B-6 (PYRIDOXINE) 50 MG tablet Take 1 tablet by mouth Daily. 21  Yes Kassi Rose APRN     PHYSICAL EXAM  /80   Wt 91.2 kg (201 lb)   LMP 2021   BMI 32.44 kg/m²   General: No acute distress. Well developed, well nourished. Pleasant.  Heart: Regular rate and rhythm. No murmurs, rubs, or gallops  Lungs: Clear to auscultation bilaterally. No wheezes, rales, or rhonchi.  Abdomen: Soft, nontender to palpation, enlarged by gravid uterus.    IMPRESSION  Vianca Jules is a 22 y.o.  at 38w6d scheduled for EIOL at 40w0d.  Will require cervical ripening.    PLAN  1.  IUP at 40w0d with EIOL  - Admit: Labor and Delivery  - Attending: Dr. Enamorado  - Condition: Stable  - Vitals: per protocol  - Activity: ad alexus  - Nursing: Continuous electronic fetal monitoring, as per protocol  - Diet: Clears  - IV fluids:  mL/hr  - Meds: SL Cytotec  - Allergies: NKDA  - Labs: CBC, T&S, UDS  - GBS: neg.  Antibiotics: N/A  - Vianca Jules and I have discussed pain goals for this hospitalization after reviewing her current clinical condition, medical history and prior pain experiences.  The goal is to keep her pain level appropriate.  Patient does desire an epidural.  - Anticipate     This document has been electronically signed by Alexa Enamorado MD on 2022 15:41 CDT.

## 2022-04-17 NOTE — PROGRESS NOTES
CC: Prenatal visit    Vianca Jules is a 22 y.o.  at 39w1d.  Doing well.  Denies contractions, LOF, or VB.  Reports good FM.    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, antepartum 10/14/2021 by Kassi Rose APRN No    Overview Signed 10/14/2021  3:36 PM by Kassi Rose APRN     CF carrier; FOB needs [employer] carrier screening           Rh negative status during pregnancy in third trimester 2021 by Kassi Rose APRN No    Marijuana abuse in remission 2021 by Kassi Rose APRN No    High risk pregnancy due to maternal drug abuse in third trimester (HCC) 2021 by Kassi Rose APRN No    Supervision of high risk pregnancy in third trimester 2021 by Kassi Rose APRN No    Overview Signed 3/9/2022 10:51 AM by Kassi Rsoe APRN     A neg / Rubella immune / GBS 35-36wk  DatinT U/S  Genetics: NIPT negative/ carrier for CF gene, FOB is negative  Tdap: given 22  Flu: declined  Anatomy: WNL  1h Glucola: WNL  H&H/Plts:   Lab Results   Component Value Date    HGB 12.5 2022    HCT 38.0 2022     2022     Breast ? Bottle/BC?                 A/P: Vianca Jules is a 22 y.o.  at 39w1d.  - Discussed delivery timing with patient and partner.  She desires to schedule IOL; she desires .  - Reviewed COVID-19 visitation policy  - Reviewed COVID-19 precautions     Diagnosis Plan   1. Supervision of high risk pregnancy in third trimester     2. 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 Rh negative status during pregnancy in third trimester     4. Marijuana abuse in remission     5. High risk pregnancy due to maternal drug abuse in third trimester  ([facility])     6. Heartburn during pregnancy in third trimester     7. 38 weeks gestation of pregnancy       Alexa Enamorado MD  4/17/2022  15:31 CDT

## 2022-04-22 ENCOUNTER — LAB (OUTPATIENT)
Dept: LAB | Facility: HOSPITAL | Age: 22
End: 2022-04-22

## 2022-04-22 DIAGNOSIS — Z01.818 PREOP TESTING: Primary | ICD-10-CM

## 2022-04-22 LAB — SARS-COV-2 N GENE RESP QL NAA+PROBE: NOT DETECTED

## 2022-04-22 PROCEDURE — 87635 SARS-COV-2 COVID-19 AMP PRB: CPT

## 2022-04-22 PROCEDURE — C9803 HOPD COVID-19 SPEC COLLECT: HCPCS

## 2022-04-23 ENCOUNTER — HOSPITAL ENCOUNTER (INPATIENT)
Dept: LABOR AND DELIVERY | Facility: HOSPITAL | Age: 22
Discharge: HOME | End: 2022-04-23

## 2022-04-23 ENCOUNTER — HOSPITAL ENCOUNTER (INPATIENT)
Facility: HOSPITAL | Age: 22
LOS: 2 days | Discharge: HOME | End: 2022-04-25
Attending: OBSTETRICS & GYNECOLOGY | Admitting: OBSTETRICS & GYNECOLOGY

## 2022-04-23 ENCOUNTER — ANESTHESIA EVENT (OUTPATIENT)
Dept: LABOR AND DELIVERY | Facility: HOSPITAL | Age: 22
End: 2022-04-23

## 2022-04-23 ENCOUNTER — ANESTHESIA (OUTPATIENT)
Dept: LABOR AND DELIVERY | Facility: HOSPITAL | Age: 22
End: 2022-04-23

## 2022-04-23 DIAGNOSIS — O09.93 SUPERVISION OF HIGH RISK PREGNANCY IN THIRD TRIMESTER: ICD-10-CM

## 2022-04-23 PROBLEM — R53.83 FATIGUE: Status: RESOLVED | Noted: 2017-09-29 | Resolved: 2022-04-23

## 2022-04-23 PROBLEM — N92.1 MENOMETRORRHAGIA: Status: RESOLVED | Noted: 2017-08-15 | Resolved: 2022-04-23

## 2022-04-23 PROBLEM — N94.6 DYSMENORRHEA: Status: RESOLVED | Noted: 2017-08-15 | Resolved: 2022-04-23

## 2022-04-23 PROBLEM — Z34.90 ENCOUNTER FOR ELECTIVE INDUCTION OF LABOR: Status: ACTIVE | Noted: 2022-04-23

## 2022-04-23 LAB
ABO GROUP BLD: NORMAL
AMPHET+METHAMPHET UR QL: NEGATIVE
AMPHETAMINES UR QL: NEGATIVE
BARBITURATES UR QL SCN: NEGATIVE
BENZODIAZ UR QL SCN: NEGATIVE
BLD GP AB SCN SERPL QL: NEGATIVE
BUPRENORPHINE SERPL-MCNC: NEGATIVE NG/ML
CANNABINOIDS SERPL QL: NEGATIVE
COCAINE UR QL: NEGATIVE
DEPRECATED RDW RBC AUTO: 43.2 FL (ref 37–54)
ERYTHROCYTE [DISTWIDTH] IN BLOOD BY AUTOMATED COUNT: 14.1 % (ref 12.3–15.4)
HCT VFR BLD AUTO: 34.1 % (ref 34–46.6)
HGB BLD-MCNC: 12.1 G/DL (ref 12–15.9)
Lab: NORMAL
MCH RBC QN AUTO: 29.9 PG (ref 26.6–33)
MCHC RBC AUTO-ENTMCNC: 35.5 G/DL (ref 31.5–35.7)
MCV RBC AUTO: 84.2 FL (ref 79–97)
METHADONE UR QL SCN: NEGATIVE
OPIATES UR QL: NEGATIVE
OXYCODONE UR QL SCN: NEGATIVE
PCP UR QL SCN: NEGATIVE
PLATELET # BLD AUTO: 174 10*3/MM3 (ref 140–450)
PMV BLD AUTO: 11.4 FL (ref 6–12)
PROPOXYPH UR QL: NEGATIVE
RBC # BLD AUTO: 4.05 10*6/MM3 (ref 3.77–5.28)
RH BLD: NEGATIVE
T&S EXPIRATION DATE: NORMAL
TRICYCLICS UR QL SCN: NEGATIVE
WBC NRBC COR # BLD: 10.99 10*3/MM3 (ref 3.4–10.8)

## 2022-04-23 PROCEDURE — 51703 INSERT BLADDER CATH COMPLEX: CPT

## 2022-04-23 PROCEDURE — 86850 RBC ANTIBODY SCREEN: CPT | Performed by: OBSTETRICS & GYNECOLOGY

## 2022-04-23 PROCEDURE — 86900 BLOOD TYPING SEROLOGIC ABO: CPT | Performed by: OBSTETRICS & GYNECOLOGY

## 2022-04-23 PROCEDURE — 10907ZC DRAINAGE OF AMNIOTIC FLUID, THERAPEUTIC FROM PRODUCTS OF CONCEPTION, VIA NATURAL OR ARTIFICIAL OPENING: ICD-10-PCS | Performed by: OBSTETRICS & GYNECOLOGY

## 2022-04-23 PROCEDURE — C1755 CATHETER, INTRASPINAL: HCPCS

## 2022-04-23 PROCEDURE — 86901 BLOOD TYPING SEROLOGIC RH(D): CPT | Performed by: OBSTETRICS & GYNECOLOGY

## 2022-04-23 PROCEDURE — 0KQM0ZZ REPAIR PERINEUM MUSCLE, OPEN APPROACH: ICD-10-PCS | Performed by: OBSTETRICS & GYNECOLOGY

## 2022-04-23 PROCEDURE — 80306 DRUG TEST PRSMV INSTRMNT: CPT | Performed by: OBSTETRICS & GYNECOLOGY

## 2022-04-23 PROCEDURE — 25010000002 FENTANYL CITRATE (PF) 100 MCG/2ML SOLUTION: Performed by: NURSE ANESTHETIST, CERTIFIED REGISTERED

## 2022-04-23 PROCEDURE — 3E033VJ INTRODUCTION OF OTHER HORMONE INTO PERIPHERAL VEIN, PERCUTANEOUS APPROACH: ICD-10-PCS | Performed by: OBSTETRICS & GYNECOLOGY

## 2022-04-23 PROCEDURE — 25010000002 DIPHENHYDRAMINE PER 50 MG

## 2022-04-23 PROCEDURE — 85027 COMPLETE CBC AUTOMATED: CPT | Performed by: OBSTETRICS & GYNECOLOGY

## 2022-04-23 PROCEDURE — 59400 OBSTETRICAL CARE: CPT | Performed by: OBSTETRICS & GYNECOLOGY

## 2022-04-23 PROCEDURE — C1755 CATHETER, INTRASPINAL: HCPCS | Performed by: NURSE ANESTHETIST, CERTIFIED REGISTERED

## 2022-04-23 RX ORDER — ONDANSETRON 2 MG/ML
4 INJECTION INTRAMUSCULAR; INTRAVENOUS EVERY 6 HOURS PRN
Status: DISCONTINUED | OUTPATIENT
Start: 2022-04-23 | End: 2022-04-23 | Stop reason: HOSPADM

## 2022-04-23 RX ORDER — PROMETHAZINE HYDROCHLORIDE 25 MG/1
25 TABLET ORAL EVERY 6 HOURS PRN
Status: DISCONTINUED | OUTPATIENT
Start: 2022-04-23 | End: 2022-04-23 | Stop reason: HOSPADM

## 2022-04-23 RX ORDER — CALCIUM CARBONATE 200(500)MG
2 TABLET,CHEWABLE ORAL DAILY
COMMUNITY
End: 2022-05-09

## 2022-04-23 RX ORDER — DIPHENHYDRAMINE HYDROCHLORIDE 50 MG/ML
50 INJECTION INTRAMUSCULAR; INTRAVENOUS EVERY 6 HOURS PRN
Status: DISCONTINUED | OUTPATIENT
Start: 2022-04-23 | End: 2022-04-23

## 2022-04-23 RX ORDER — BISACODYL 10 MG
10 SUPPOSITORY, RECTAL RECTAL DAILY PRN
Status: DISCONTINUED | OUTPATIENT
Start: 2022-04-24 | End: 2022-04-25 | Stop reason: HOSPADM

## 2022-04-23 RX ORDER — OXYTOCIN/0.9 % SODIUM CHLORIDE 30/500 ML
PLASTIC BAG, INJECTION (ML) INTRAVENOUS
Status: DISCONTINUED
Start: 2022-04-23 | End: 2022-04-23 | Stop reason: WASHOUT

## 2022-04-23 RX ORDER — OXYTOCIN/0.9 % SODIUM CHLORIDE 30/500 ML
2 PLASTIC BAG, INJECTION (ML) INTRAVENOUS
Status: DISCONTINUED | OUTPATIENT
Start: 2022-04-23 | End: 2022-04-23

## 2022-04-23 RX ORDER — BUPIVACAINE HYDROCHLORIDE 2.5 MG/ML
INJECTION, SOLUTION EPIDURAL; INFILTRATION; INTRACAUDAL AS NEEDED
Status: DISCONTINUED | OUTPATIENT
Start: 2022-04-23 | End: 2022-04-23 | Stop reason: SURG

## 2022-04-23 RX ORDER — PRENATAL VIT/IRON FUM/FOLIC AC 27MG-0.8MG
1 TABLET ORAL DAILY
Status: DISCONTINUED | OUTPATIENT
Start: 2022-04-24 | End: 2022-04-25 | Stop reason: HOSPADM

## 2022-04-23 RX ORDER — CALCIUM CARBONATE 200(500)MG
2 TABLET,CHEWABLE ORAL 3 TIMES DAILY PRN
Status: DISCONTINUED | OUTPATIENT
Start: 2022-04-23 | End: 2022-04-25 | Stop reason: HOSPADM

## 2022-04-23 RX ORDER — FENTANYL CITRATE 50 UG/ML
INJECTION, SOLUTION INTRAMUSCULAR; INTRAVENOUS AS NEEDED
Status: DISCONTINUED | OUTPATIENT
Start: 2022-04-23 | End: 2022-04-23 | Stop reason: SURG

## 2022-04-23 RX ORDER — IBUPROFEN 800 MG/1
800 TABLET ORAL EVERY 8 HOURS
Status: DISCONTINUED | OUTPATIENT
Start: 2022-04-23 | End: 2022-04-25 | Stop reason: HOSPADM

## 2022-04-23 RX ORDER — ACETAMINOPHEN 500 MG
1000 TABLET ORAL EVERY 6 HOURS
Status: DISCONTINUED | OUTPATIENT
Start: 2022-04-23 | End: 2022-04-25 | Stop reason: HOSPADM

## 2022-04-23 RX ORDER — ACETAMINOPHEN 500 MG
1000 TABLET ORAL EVERY 6 HOURS PRN
COMMUNITY
End: 2022-04-25 | Stop reason: HOSPADM

## 2022-04-23 RX ORDER — ONDANSETRON 4 MG/1
4 TABLET, FILM COATED ORAL EVERY 6 HOURS PRN
Status: DISCONTINUED | OUTPATIENT
Start: 2022-04-23 | End: 2022-04-23 | Stop reason: HOSPADM

## 2022-04-23 RX ORDER — SODIUM CHLORIDE, SODIUM LACTATE, POTASSIUM CHLORIDE, CALCIUM CHLORIDE 600; 310; 30; 20 MG/100ML; MG/100ML; MG/100ML; MG/100ML
INJECTION, SOLUTION INTRAVENOUS
Status: COMPLETED
Start: 2022-04-23 | End: 2022-04-23

## 2022-04-23 RX ORDER — FERROUS SULFATE TAB EC 324 MG (65 MG FE EQUIVALENT) 324 (65 FE) MG
324 TABLET DELAYED RESPONSE ORAL 2 TIMES DAILY WITH MEALS
Status: DISCONTINUED | OUTPATIENT
Start: 2022-04-23 | End: 2022-04-25 | Stop reason: HOSPADM

## 2022-04-23 RX ORDER — ACETAMINOPHEN 500 MG
TABLET ORAL
Status: COMPLETED
Start: 2022-04-23 | End: 2022-04-23

## 2022-04-23 RX ORDER — ACETAMINOPHEN 500 MG
1000 TABLET ORAL ONCE
Status: COMPLETED | OUTPATIENT
Start: 2022-04-23 | End: 2022-04-23

## 2022-04-23 RX ORDER — LIDOCAINE HYDROCHLORIDE AND EPINEPHRINE 20; 5 MG/ML; UG/ML
INJECTION, SOLUTION EPIDURAL; INFILTRATION; INTRACAUDAL; PERINEURAL AS NEEDED
Status: DISCONTINUED | OUTPATIENT
Start: 2022-04-23 | End: 2022-04-23 | Stop reason: SURG

## 2022-04-23 RX ORDER — SODIUM CHLORIDE 0.9 % (FLUSH) 0.9 %
3 SYRINGE (ML) INJECTION EVERY 12 HOURS SCHEDULED
Status: DISCONTINUED | OUTPATIENT
Start: 2022-04-23 | End: 2022-04-23 | Stop reason: HOSPADM

## 2022-04-23 RX ORDER — OXYTOCIN/0.9 % SODIUM CHLORIDE 30/500 ML
85 PLASTIC BAG, INJECTION (ML) INTRAVENOUS ONCE
Status: COMPLETED | OUTPATIENT
Start: 2022-04-23 | End: 2022-04-23

## 2022-04-23 RX ORDER — HYDROCORTISONE 25 MG/G
1 CREAM TOPICAL 3 TIMES DAILY PRN
Status: DISCONTINUED | OUTPATIENT
Start: 2022-04-23 | End: 2022-04-25 | Stop reason: HOSPADM

## 2022-04-23 RX ORDER — MISOPROSTOL 200 UG/1
800 TABLET ORAL AS NEEDED
Status: DISCONTINUED | OUTPATIENT
Start: 2022-04-23 | End: 2022-04-23 | Stop reason: HOSPADM

## 2022-04-23 RX ORDER — CARBOPROST TROMETHAMINE 250 UG/ML
250 INJECTION, SOLUTION INTRAMUSCULAR AS NEEDED
Status: DISCONTINUED | OUTPATIENT
Start: 2022-04-23 | End: 2022-04-23 | Stop reason: HOSPADM

## 2022-04-23 RX ORDER — MISOPROSTOL 100 MCG
50 TABLET ORAL EVERY 6 HOURS
Status: DISCONTINUED | OUTPATIENT
Start: 2022-04-23 | End: 2022-04-23

## 2022-04-23 RX ORDER — PROMETHAZINE HYDROCHLORIDE 12.5 MG/1
12.5 SUPPOSITORY RECTAL EVERY 6 HOURS PRN
Status: DISCONTINUED | OUTPATIENT
Start: 2022-04-23 | End: 2022-04-23 | Stop reason: HOSPADM

## 2022-04-23 RX ORDER — DOCUSATE SODIUM 100 MG/1
100 CAPSULE, LIQUID FILLED ORAL 2 TIMES DAILY
Status: DISCONTINUED | OUTPATIENT
Start: 2022-04-23 | End: 2022-04-25 | Stop reason: HOSPADM

## 2022-04-23 RX ORDER — ONDANSETRON 2 MG/ML
4 INJECTION INTRAMUSCULAR; INTRAVENOUS EVERY 6 HOURS PRN
Status: DISCONTINUED | OUTPATIENT
Start: 2022-04-23 | End: 2022-04-25 | Stop reason: HOSPADM

## 2022-04-23 RX ORDER — METHYLERGONOVINE MALEATE 0.2 MG/ML
200 INJECTION INTRAVENOUS ONCE AS NEEDED
Status: DISCONTINUED | OUTPATIENT
Start: 2022-04-23 | End: 2022-04-23 | Stop reason: HOSPADM

## 2022-04-23 RX ORDER — IBUPROFEN 800 MG/1
800 TABLET ORAL EVERY 8 HOURS
Status: DISCONTINUED | OUTPATIENT
Start: 2022-04-23 | End: 2022-04-23 | Stop reason: HOSPADM

## 2022-04-23 RX ORDER — ONDANSETRON 4 MG/1
4 TABLET, FILM COATED ORAL EVERY 6 HOURS PRN
Status: DISCONTINUED | OUTPATIENT
Start: 2022-04-23 | End: 2022-04-25 | Stop reason: HOSPADM

## 2022-04-23 RX ORDER — LIDOCAINE HYDROCHLORIDE 10 MG/ML
5 INJECTION, SOLUTION EPIDURAL; INFILTRATION; INTRACAUDAL; PERINEURAL AS NEEDED
Status: DISCONTINUED | OUTPATIENT
Start: 2022-04-23 | End: 2022-04-23 | Stop reason: HOSPADM

## 2022-04-23 RX ORDER — SODIUM CHLORIDE 0.9 % (FLUSH) 0.9 %
1-10 SYRINGE (ML) INJECTION AS NEEDED
Status: DISCONTINUED | OUTPATIENT
Start: 2022-04-23 | End: 2022-04-25 | Stop reason: HOSPADM

## 2022-04-23 RX ORDER — ACETAMINOPHEN 500 MG
1000 TABLET ORAL EVERY 6 HOURS
Status: DISCONTINUED | OUTPATIENT
Start: 2022-04-23 | End: 2022-04-23 | Stop reason: HOSPADM

## 2022-04-23 RX ORDER — OXYTOCIN/0.9 % SODIUM CHLORIDE 30/500 ML
85 PLASTIC BAG, INJECTION (ML) INTRAVENOUS ONCE
Status: DISCONTINUED | OUTPATIENT
Start: 2022-04-23 | End: 2022-04-24

## 2022-04-23 RX ORDER — SODIUM CHLORIDE, SODIUM LACTATE, POTASSIUM CHLORIDE, CALCIUM CHLORIDE 600; 310; 30; 20 MG/100ML; MG/100ML; MG/100ML; MG/100ML
125 INJECTION, SOLUTION INTRAVENOUS CONTINUOUS
Status: DISCONTINUED | OUTPATIENT
Start: 2022-04-23 | End: 2022-04-23

## 2022-04-23 RX ORDER — DIPHENHYDRAMINE HYDROCHLORIDE 50 MG/ML
INJECTION INTRAMUSCULAR; INTRAVENOUS
Status: COMPLETED
Start: 2022-04-23 | End: 2022-04-23

## 2022-04-23 RX ORDER — SODIUM CHLORIDE 0.9 % (FLUSH) 0.9 %
3-10 SYRINGE (ML) INJECTION AS NEEDED
Status: DISCONTINUED | OUTPATIENT
Start: 2022-04-23 | End: 2022-04-23 | Stop reason: HOSPADM

## 2022-04-23 RX ORDER — OXYTOCIN/0.9 % SODIUM CHLORIDE 30/500 ML
650 PLASTIC BAG, INJECTION (ML) INTRAVENOUS ONCE
Status: DISCONTINUED | OUTPATIENT
Start: 2022-04-23 | End: 2022-04-24

## 2022-04-23 RX ORDER — BUTORPHANOL TARTRATE 1 MG/ML
1 INJECTION, SOLUTION INTRAMUSCULAR; INTRAVENOUS
Status: DISCONTINUED | OUTPATIENT
Start: 2022-04-23 | End: 2022-04-23 | Stop reason: HOSPADM

## 2022-04-23 RX ORDER — OXYTOCIN/0.9 % SODIUM CHLORIDE 30/500 ML
650 PLASTIC BAG, INJECTION (ML) INTRAVENOUS ONCE
Status: DISCONTINUED | OUTPATIENT
Start: 2022-04-23 | End: 2022-04-23 | Stop reason: HOSPADM

## 2022-04-23 RX ADMIN — SODIUM CHLORIDE, POTASSIUM CHLORIDE, SODIUM LACTATE AND CALCIUM CHLORIDE 125 ML/HR: 600; 310; 30; 20 INJECTION, SOLUTION INTRAVENOUS at 11:40

## 2022-04-23 RX ADMIN — DOCUSATE SODIUM 100 MG: 100 CAPSULE, LIQUID FILLED ORAL at 21:19

## 2022-04-23 RX ADMIN — OXYTOCIN-SODIUM CHLORIDE 0.9% IV SOLN 30 UNIT/500ML 2 MILLI-UNITS/MIN: 30-0.9/5 SOLUTION at 07:07

## 2022-04-23 RX ADMIN — Medication 1000 MG: at 07:32

## 2022-04-23 RX ADMIN — Medication 14 ML/HR: at 12:01

## 2022-04-23 RX ADMIN — IBUPROFEN 800 MG: 800 TABLET, FILM COATED ORAL at 20:09

## 2022-04-23 RX ADMIN — FENTANYL CITRATE 100 MCG: 50 INJECTION, SOLUTION INTRAMUSCULAR; INTRAVENOUS at 15:02

## 2022-04-23 RX ADMIN — OXYTOCIN-SODIUM CHLORIDE 0.9% IV SOLN 30 UNIT/500ML 85 ML/HR: 30-0.9/5 SOLUTION at 19:07

## 2022-04-23 RX ADMIN — ACETAMINOPHEN 1000 MG: 500 TABLET, FILM COATED ORAL at 15:08

## 2022-04-23 RX ADMIN — BENZOCAINE AND LEVOMENTHOL 1 APPLICATION: 200; 5 SPRAY TOPICAL at 21:17

## 2022-04-23 RX ADMIN — LIDOCAINE HYDROCHLORIDE,EPINEPHRINE BITARTRATE 8 ML: 20; .005 INJECTION, SOLUTION EPIDURAL; INFILTRATION; INTRACAUDAL; PERINEURAL at 15:02

## 2022-04-23 RX ADMIN — FENTANYL CITRATE 100 MCG: 50 INJECTION, SOLUTION INTRAMUSCULAR; INTRAVENOUS at 11:55

## 2022-04-23 RX ADMIN — ACETAMINOPHEN 1000 MG: 500 TABLET, FILM COATED ORAL at 20:08

## 2022-04-23 RX ADMIN — BUPIVACAINE HYDROCHLORIDE 8 ML: 2.5 INJECTION, SOLUTION EPIDURAL; INFILTRATION; INTRACAUDAL; PERINEURAL at 11:55

## 2022-04-23 RX ADMIN — SODIUM CHLORIDE, POTASSIUM CHLORIDE, SODIUM LACTATE AND CALCIUM CHLORIDE 125 ML/HR: 600; 310; 30; 20 INJECTION, SOLUTION INTRAVENOUS at 06:37

## 2022-04-23 RX ADMIN — DIPHENHYDRAMINE HYDROCHLORIDE 50 MG: 50 INJECTION INTRAMUSCULAR; INTRAVENOUS at 10:39

## 2022-04-23 RX ADMIN — FERROUS SULFATE TAB EC 324 MG (65 MG FE EQUIVALENT) 324 MG: 324 (65 FE) TABLET DELAYED RESPONSE at 21:19

## 2022-04-23 RX ADMIN — ACETAMINOPHEN 1000 MG: 500 TABLET, FILM COATED ORAL at 07:32

## 2022-04-23 NOTE — ANESTHESIA PROCEDURE NOTES
Labor Epidural      Patient reassessed immediately prior to procedure    Patient location during procedure: OB  Start Time: 4/23/2022 11:39 AM  Stop Time: 4/23/2022 12:01 PM  Performed By  CRNA: Kiya Hernandez CRNA  Preanesthetic Checklist  Completed: patient identified, IV checked, risks and benefits discussed, surgical consent, monitors and equipment checked, pre-op evaluation and timeout performed  Additional Notes  Easy epidural  Prep:  Pt Position:sitting  Sterile Tech:cap, gloves, mask and sterile barrier  Prep:povidone-iodine 7.5% surgical scrub  Monitoring:continuous pulse oximetry and blood pressure monitoring  Epidural Block Procedure:  Approach:midline  Guidance:palpation technique  Location:L3-L4  Needle Type:Tuohy  Needle Gauge:17 G  Loss of Resistance Medium: air  Loss of Resistance: 9cm  Cath Depth at skin:15 cm  Paresthesia: left and transient  Aspiration:negative  Epidural test dose: Test dose is not in the tray and pharmacy doesnt have it.  Number of Attempts: 1  Post Assessment:  Dressing:biopatch applied, occlusive dressing applied and secured with tape  Pt Tolerance:patient tolerated the procedure well with no apparent complications  Complications:no

## 2022-04-23 NOTE — ANESTHESIA PREPROCEDURE EVALUATION
Anesthesia Evaluation     Patient summary reviewed and Nursing notes reviewed   NPO Solid Status: > 8 hours             Airway   Mallampati: II  TM distance: >3 FB  Neck ROM: full  No difficulty expected  Dental - normal exam     Pulmonary - negative pulmonary ROS and normal exam    breath sounds clear to auscultation  Cardiovascular - negative cardio ROS and normal exam    Rhythm: regular  Rate: normal        Neuro/Psych  (+) headaches, psychiatric history Anxiety and Depression,    GI/Hepatic/Renal/Endo - negative ROS     Musculoskeletal (-) negative ROS    Abdominal  - normal exam   Substance History - negative use     OB/GYN negative ob/gyn ROS         Other                        Anesthesia Plan    ASA 2     epidural       Anesthetic plan, all risks, benefits, and alternatives have been provided, discussed and informed consent has been obtained with: patient.        CODE STATUS:    Code Status (Patient has no pulse and is not breathing): CPR (Attempt to Resuscitate)  Medical Interventions (Patient has pulse or is breathing): Full

## 2022-04-24 LAB
FETAL BLEED: NEGATIVE
NUMBER OF DOSES: NORMAL

## 2022-04-24 PROCEDURE — 25010000002 RHO D IMMUNE GLOBULIN 1500 UNITS SOLUTION PREFILLED SYRINGE: Performed by: OBSTETRICS & GYNECOLOGY

## 2022-04-24 PROCEDURE — 85461 HEMOGLOBIN FETAL: CPT | Performed by: OBSTETRICS & GYNECOLOGY

## 2022-04-24 PROCEDURE — 0503F POSTPARTUM CARE VISIT: CPT | Performed by: OBSTETRICS & GYNECOLOGY

## 2022-04-24 RX ADMIN — DOCUSATE SODIUM 100 MG: 100 CAPSULE, LIQUID FILLED ORAL at 19:50

## 2022-04-24 RX ADMIN — Medication 1 TABLET: at 08:53

## 2022-04-24 RX ADMIN — FERROUS SULFATE TAB EC 324 MG (65 MG FE EQUIVALENT) 324 MG: 324 (65 FE) TABLET DELAYED RESPONSE at 18:57

## 2022-04-24 RX ADMIN — ACETAMINOPHEN 1000 MG: 500 TABLET, FILM COATED ORAL at 06:03

## 2022-04-24 RX ADMIN — FERROUS SULFATE TAB EC 324 MG (65 MG FE EQUIVALENT) 324 MG: 324 (65 FE) TABLET DELAYED RESPONSE at 08:53

## 2022-04-24 RX ADMIN — ACETAMINOPHEN 1000 MG: 500 TABLET, FILM COATED ORAL at 18:57

## 2022-04-24 RX ADMIN — DOCUSATE SODIUM 100 MG: 100 CAPSULE, LIQUID FILLED ORAL at 08:53

## 2022-04-24 RX ADMIN — IBUPROFEN 800 MG: 800 TABLET, FILM COATED ORAL at 19:49

## 2022-04-24 RX ADMIN — ACETAMINOPHEN 1000 MG: 500 TABLET, FILM COATED ORAL at 12:49

## 2022-04-24 RX ADMIN — RHO(D) IMMUNE GLOBULIN (HUMAN) 1500 UNITS: 1500 SOLUTION INTRAMUSCULAR at 20:56

## 2022-04-24 RX ADMIN — IBUPROFEN 800 MG: 800 TABLET, FILM COATED ORAL at 06:03

## 2022-04-24 NOTE — ANESTHESIA POSTPROCEDURE EVALUATION
Patient: Vianca Jules    Procedure Summary     Date: 04/23/22 Room / Location:     Anesthesia Start: 1138 Anesthesia Stop: 1807    Procedure: LABOR ANALGESIA Diagnosis:     Scheduled Providers:  Provider:     Anesthesia Type: epidural ASA Status: 2          Anesthesia Type: epidural    Vitals  Vitals Value Taken Time   /73 04/23/22 1932   Temp 98.1 °F (36.7 °C) 04/23/22 1832   Pulse 82 04/23/22 1932   Resp 18 04/23/22 1832   SpO2 99 % 04/23/22 1832   Vitals shown include unvalidated device data.        Post Anesthesia Care and Evaluation    Patient location during evaluation: bedside  Patient participation: complete - patient participated  Level of consciousness: awake  Pain score: 0  Pain management: adequate  Airway patency: patent  Anesthetic complications: No anesthetic complications  PONV Status: none  Cardiovascular status: acceptable  Respiratory status: acceptable  Hydration status: acceptable  Post Neuraxial Block status: Motor and sensory function returned to baseline and No signs or symptoms of PDPH

## 2022-04-25 VITALS
BODY MASS INDEX: 33.32 KG/M2 | OXYGEN SATURATION: 97 % | DIASTOLIC BLOOD PRESSURE: 78 MMHG | RESPIRATION RATE: 16 BRPM | SYSTOLIC BLOOD PRESSURE: 123 MMHG | HEIGHT: 65 IN | HEART RATE: 92 BPM | WEIGHT: 200 LBS | TEMPERATURE: 98 F

## 2022-04-25 PROCEDURE — 0503F POSTPARTUM CARE VISIT: CPT | Performed by: OBSTETRICS & GYNECOLOGY

## 2022-04-25 RX ORDER — ACETAMINOPHEN 500 MG
1000 TABLET ORAL EVERY 6 HOURS PRN
Qty: 30 TABLET | Refills: 1 | Status: SHIPPED | OUTPATIENT
Start: 2022-04-25 | End: 2022-06-06

## 2022-04-25 RX ORDER — IBUPROFEN 800 MG/1
800 TABLET ORAL EVERY 8 HOURS PRN
Qty: 30 TABLET | Refills: 1 | Status: SHIPPED | OUTPATIENT
Start: 2022-04-25 | End: 2022-06-06

## 2022-04-25 RX ADMIN — DOCUSATE SODIUM 100 MG: 100 CAPSULE, LIQUID FILLED ORAL at 11:30

## 2022-04-25 RX ADMIN — FERROUS SULFATE TAB EC 324 MG (65 MG FE EQUIVALENT) 324 MG: 324 (65 FE) TABLET DELAYED RESPONSE at 07:33

## 2022-04-25 RX ADMIN — Medication 1 TABLET: at 11:30

## 2022-04-25 RX ADMIN — IBUPROFEN 800 MG: 800 TABLET, FILM COATED ORAL at 04:23

## 2022-04-25 RX ADMIN — ACETAMINOPHEN 1000 MG: 500 TABLET, FILM COATED ORAL at 04:23

## 2022-04-25 RX ADMIN — ACETAMINOPHEN 1000 MG: 500 TABLET, FILM COATED ORAL at 11:30

## 2022-04-25 RX ADMIN — IBUPROFEN 800 MG: 800 TABLET, FILM COATED ORAL at 15:49

## 2022-05-09 ENCOUNTER — OFFICE VISIT (OUTPATIENT)
Dept: OBSTETRICS AND GYNECOLOGY | Facility: CLINIC | Age: 22
End: 2022-05-09

## 2022-05-09 PROBLEM — Z34.90 ENCOUNTER FOR ELECTIVE INDUCTION OF LABOR: Status: RESOLVED | Noted: 2022-04-23 | Resolved: 2022-05-09

## 2022-05-09 PROCEDURE — 0503F POSTPARTUM CARE VISIT: CPT | Performed by: OBSTETRICS & GYNECOLOGY

## 2022-06-06 ENCOUNTER — POSTPARTUM VISIT (OUTPATIENT)
Dept: OBSTETRICS AND GYNECOLOGY | Facility: CLINIC | Age: 22
End: 2022-06-06

## 2022-06-06 VITALS
BODY MASS INDEX: 29.32 KG/M2 | DIASTOLIC BLOOD PRESSURE: 76 MMHG | SYSTOLIC BLOOD PRESSURE: 120 MMHG | HEIGHT: 65 IN | WEIGHT: 176 LBS

## 2022-06-06 DIAGNOSIS — Z30.09 ENCOUNTER FOR COUNSELING REGARDING CONTRACEPTION: ICD-10-CM

## 2022-06-06 DIAGNOSIS — Z12.4 ENCOUNTER FOR PAPANICOLAOU SMEAR FOR CERVICAL CANCER SCREENING: ICD-10-CM

## 2022-06-06 PROBLEM — F19.10 HIGH RISK PREGNANCY DUE TO MATERNAL DRUG ABUSE IN THIRD TRIMESTER (HCC): Status: RESOLVED | Noted: 2021-09-30 | Resolved: 2022-06-06

## 2022-06-06 PROBLEM — O99.323 HIGH RISK PREGNANCY DUE TO MATERNAL DRUG ABUSE IN THIRD TRIMESTER (HCC): Status: RESOLVED | Noted: 2021-09-30 | Resolved: 2022-06-06

## 2022-06-06 PROBLEM — Z67.91 RH NEGATIVE STATUS DURING PREGNANCY IN THIRD TRIMESTER: Status: RESOLVED | Noted: 2021-09-30 | Resolved: 2022-06-06

## 2022-06-06 PROBLEM — O09.893 CYSTIC FIBROSIS CARRIER IN THIRD TRIMESTER, ANTEPARTUM: Status: RESOLVED | Noted: 2021-10-14 | Resolved: 2022-06-06

## 2022-06-06 PROBLEM — O26.893 HEARTBURN DURING PREGNANCY IN THIRD TRIMESTER: Status: RESOLVED | Noted: 2022-03-09 | Resolved: 2022-06-06

## 2022-06-06 PROBLEM — O09.93 SUPERVISION OF HIGH RISK PREGNANCY IN THIRD TRIMESTER: Status: RESOLVED | Noted: 2021-09-29 | Resolved: 2022-06-06

## 2022-06-06 PROBLEM — F12.11 MARIJUANA ABUSE IN REMISSION: Status: RESOLVED | Noted: 2021-09-30 | Resolved: 2022-06-06

## 2022-06-06 PROBLEM — Z14.1 CYSTIC FIBROSIS CARRIER IN THIRD TRIMESTER, ANTEPARTUM: Status: RESOLVED | Noted: 2021-10-14 | Resolved: 2022-06-06

## 2022-06-06 PROBLEM — O26.893 RH NEGATIVE STATUS DURING PREGNANCY IN THIRD TRIMESTER: Status: RESOLVED | Noted: 2021-09-30 | Resolved: 2022-06-06

## 2022-06-06 PROBLEM — R12 HEARTBURN DURING PREGNANCY IN THIRD TRIMESTER: Status: RESOLVED | Noted: 2022-03-09 | Resolved: 2022-06-06

## 2022-06-06 PROCEDURE — 0503F POSTPARTUM CARE VISIT: CPT | Performed by: OBSTETRICS & GYNECOLOGY

## 2022-06-06 RX ORDER — NORGESTIMATE AND ETHINYL ESTRADIOL 0.25-0.035
1 KIT ORAL DAILY
Qty: 84 TABLET | Refills: 3 | Status: SHIPPED | OUTPATIENT
Start: 2022-06-06

## 2022-06-06 NOTE — PROGRESS NOTES
"Postpartum visit      Vianca Jules is a 22 y.o.  s/p Vaginal, Spontaneous on 2022 at 40w0d secondary to EIOL who presents today for postpartum check.  The patient states she is doing well.  Patient denies postpartum depression.  Menstrual cycles have not resumed.  Bottlefeeding.  Desires BCP for contraception.  She has not resumed sexual intercourse.  Denies bowel or bladder issues.    PHYSICAL EXAM:    /76   Ht 165.1 cm (65\")   Wt 79.8 kg (176 lb)   LMP 2021 (Approximate)   BMI 29.29 kg/m²   Gen: NAD, AAO x3  Abd: Soft, NTND  External Genitalia/Vulva: Anatomy is normal, no significant redness of labia, no discharge on vulvar tissues, [place]'s and Bartholin's glands are normal, no ulcers, no condylomatous lesions.  Laceration site healed well with scar noted.  Urethral meatus: Normal, no lesions, no prolapse.  Urethra: Normal, no masses, no tenderness with palpation.  Bladder: Normal, no fullness, no masses, no tenderness with palpation.  Vagina: Vaginal tissues are not inflamed, normal color and texture, no significant discharge present.  Pelvic support adequate.  Cervix: Normal, no lesions, no purulent discharge, no cervical motion tenderness.  Pap smear obtained.  Uterus: Normal size, shape, and consistency.  Good mobility noted.  Minimal descent noted with good support.  Adnexa: Normal size and shape bilaterally, no palpable mass bilaterally and non-tender bilaterally.  Rectal: Normal, no masses or polyps, confirms bimanual exam, perianal normal, no lesions; TRACEY deferred.   Postpartum Depression Screening Questionnaire: 12    IMPRESSION/PLAN: Vianca Jules is a 22 y.o.  s/p Vaginal, Spontaneous on .  Doing well.  - Recovered nicely from her delivery  - Contraception: OCPs  - Restrictions lifted  - Pap smear today  - Denies PPD, mood okay per patient  - Advised coconut oil to incision site if needed.  If no improvement w/ that, may need Premarin cream.    This document " has been electronically signed by Alexa Enamorado MD on June 6, 2022 13:00 CDT.

## 2022-06-08 LAB — REF LAB TEST METHOD: NORMAL

## 2022-06-15 ENCOUNTER — OFFICE VISIT (OUTPATIENT)
Dept: OBSTETRICS AND GYNECOLOGY | Facility: CLINIC | Age: 22
End: 2022-06-15

## 2022-06-15 PROCEDURE — 99442 PR PHYS/QHP TELEPHONE EVALUATION 11-20 MIN: CPT | Performed by: OBSTETRICS & GYNECOLOGY

## 2022-06-15 NOTE — PROGRESS NOTES
[facility]  Gynecology  Date of Service: 06/15/2022    CC: Postpartum depression    HPI  Vianca Jules is a 22 y.o.  premenopausal female who presents with complaints of postpartum depression.  She states that over the past couple of weeks, she has been having worsening symptoms.  She has been waking up crying, feeling overwhelmed, having thoughts of excessive worry, decreased appetite, sleep issues, and overall feeling down.    ROS  Review of Systems   Constitutional: Negative.    HENT: Negative.    Eyes: Negative.    Respiratory: Negative.    Cardiovascular: Negative.    Gastrointestinal: Negative.    Endocrine: Negative.    Genitourinary: Negative.    Musculoskeletal: Negative.    Skin: Negative.    Allergic/Immunologic: Negative.    Neurological: Negative.    Hematological: Negative.    Psychiatric/Behavioral: Negative.      OB HISTORY  OB History    Para Term  AB Living   1 1 1     1   SAB IAB Ectopic Molar Multiple Live Births           0 1      # Outcome Date GA Lbr Ramakrishna/2nd Weight Sex Delivery Anes PTL Lv   1 Term 22 40w0d 09:00 / 03:32 3660 g (8 lb 1.1 oz) M Vag-Spont EPI N CLEMENTINA     PAST MEDICAL HISTORY  Past Medical History:   Diagnosis Date   • Allergic rhinitis    • Anxiety    • Cystic fibrosis carrier 10/14/2021   • Depression    • Marijuana abuse 2021   • Migraine      PAST SURGICAL HISTORY  Past Surgical History:   Procedure Laterality Date   • CYST REMOVAL Left     L arm cyst removed     FAMILY HISTORY  Family History   Problem Relation Age of Onset   • Thyroid disease Maternal Grandmother    • Diabetes Paternal Grandmother      SOCIAL HISTORY  Social History     Socioeconomic History   • Marital status:    Tobacco Use   • Smoking status: Former Smoker     Packs/day: 0.00     Years: 3.00     Pack years: 0.00     Types: Electronic Cigarette     Quit date: 2019     Years since quittin.8   • Smokeless tobacco: Current User      Types: Chew   • Tobacco comment: prior hx but quit approx mid Aug   Substance and Sexual Activity   • Alcohol use: Not Currently     Alcohol/week: 0.0 standard drinks     Comment: 1-2 beers per month   • Drug use: No   • Sexual activity: Yes     Partners: Male     Birth control/protection: None     ALLERGIES  Allergies   Allergen Reactions   • Pineapple Anaphylaxis     HOME MEDICATIONS  Prior to Admission medications    Medication Sig Start Date End Date Taking? Authorizing Provider   norgestimate-ethinyl estradiol (Sprintec 28) 0.25-35 MG-MCG per tablet Take 1 tablet by mouth Daily. 22  Yes Alexa Enamorado MD     IMPRESSION  Vianca Jules is a 22 y.o.  presenting with postpartum depression.    PLAN    1. Postpartum depression  Recommend counseling + SSRI therapy; patient agreeable but requests telemedicine option.  [facility].  - sertraline (Zoloft) 50 MG tablet; Take 1 tablet by mouth Daily.  Dispense: 30 tablet; Refill: 6  - She was instructed to message us in 1-2 months to see how her medicine is working and if we need to increase the dose.    This document has been electronically signed by Alexa Enamorado MD on Norma 15, 2022 11:49 CDT.    This visit has been rescheduled as a phone visit to comply with patient safety concerns in accordance with CDC recommendations.  Total time of discussion was 12 minutes.  The use of a telephone visit has been reviewed with the patient and verbal informed consent has been obtained.

## 2023-04-11 DIAGNOSIS — Z30.09 ENCOUNTER FOR COUNSELING REGARDING CONTRACEPTION: ICD-10-CM

## 2023-04-12 RX ORDER — NORGESTIMATE AND ETHINYL ESTRADIOL 0.25-0.035
KIT ORAL
Qty: 84 TABLET | Refills: 3 | Status: SHIPPED | OUTPATIENT
Start: 2023-04-12